# Patient Record
Sex: FEMALE | Race: WHITE | Employment: OTHER | URBAN - METROPOLITAN AREA
[De-identification: names, ages, dates, MRNs, and addresses within clinical notes are randomized per-mention and may not be internally consistent; named-entity substitution may affect disease eponyms.]

---

## 2022-03-29 LAB
AVERAGE GLUCOSE: ABNORMAL
HBA1C MFR BLD: 6.3 %

## 2022-05-25 ENCOUNTER — OFFICE VISIT (OUTPATIENT)
Dept: ENDOCRINOLOGY | Age: 87
End: 2022-05-25
Payer: MEDICARE

## 2022-05-25 VITALS
WEIGHT: 158 LBS | OXYGEN SATURATION: 98 % | SYSTOLIC BLOOD PRESSURE: 162 MMHG | HEART RATE: 72 BPM | DIASTOLIC BLOOD PRESSURE: 84 MMHG

## 2022-05-25 DIAGNOSIS — E04.2 MULTIPLE THYROID NODULES: ICD-10-CM

## 2022-05-25 DIAGNOSIS — E21.0 PRIMARY HYPERPARATHYROIDISM (HCC): ICD-10-CM

## 2022-05-25 DIAGNOSIS — I10 ESSENTIAL HYPERTENSION: ICD-10-CM

## 2022-05-25 DIAGNOSIS — E21.0 PRIMARY HYPERPARATHYROIDISM (HCC): Primary | ICD-10-CM

## 2022-05-25 PROBLEM — I49.3 PREMATURE VENTRICULAR CONTRACTIONS: Status: ACTIVE | Noted: 2022-05-25

## 2022-05-25 PROBLEM — E78.00 HYPERCHOLESTEROLEMIA: Status: ACTIVE | Noted: 2022-05-25

## 2022-05-25 PROCEDURE — G8428 CUR MEDS NOT DOCUMENT: HCPCS | Performed by: INTERNAL MEDICINE

## 2022-05-25 PROCEDURE — 4004F PT TOBACCO SCREEN RCVD TLK: CPT | Performed by: INTERNAL MEDICINE

## 2022-05-25 PROCEDURE — 1123F ACP DISCUSS/DSCN MKR DOCD: CPT | Performed by: INTERNAL MEDICINE

## 2022-05-25 PROCEDURE — G8421 BMI NOT CALCULATED: HCPCS | Performed by: INTERNAL MEDICINE

## 2022-05-25 PROCEDURE — 99204 OFFICE O/P NEW MOD 45 MIN: CPT | Performed by: INTERNAL MEDICINE

## 2022-05-25 PROCEDURE — 1090F PRES/ABSN URINE INCON ASSESS: CPT | Performed by: INTERNAL MEDICINE

## 2022-05-25 PROCEDURE — G8400 PT W/DXA NO RESULTS DOC: HCPCS | Performed by: INTERNAL MEDICINE

## 2022-05-25 RX ORDER — ATORVASTATIN CALCIUM 40 MG/1
40 TABLET, FILM COATED ORAL DAILY
COMMUNITY
Start: 2022-03-30

## 2022-05-25 RX ORDER — ASPIRIN 81 MG/1
81 TABLET ORAL DAILY
COMMUNITY

## 2022-05-25 RX ORDER — AMLODIPINE BESYLATE 10 MG/1
10 TABLET ORAL DAILY
COMMUNITY
Start: 2022-03-30

## 2022-05-25 RX ORDER — SENNOSIDES 8.6 MG
CAPSULE ORAL EVERY 8 HOURS
COMMUNITY

## 2022-05-25 RX ORDER — FLUTICASONE PROPIONATE 50 MCG
SPRAY, SUSPENSION (ML) NASAL
COMMUNITY
Start: 2022-03-30

## 2022-05-25 RX ORDER — LORATADINE 10 MG/1
TABLET ORAL
COMMUNITY

## 2022-05-25 ASSESSMENT — ENCOUNTER SYMPTOMS
DIARRHEA: 0
CONSTIPATION: 0
TROUBLE SWALLOWING: 0
VOICE CHANGE: 1

## 2022-05-25 NOTE — PROGRESS NOTES
Maira Strauss MD, [address]            Reason for visit: Clayton Marvin is referred by JENNIFER Harris NP ([address] [place] [address]") for the evaluation and management of hyperparathyroidism and multinodular goiter. ASSESSMENT AND PLAN:    1. Primary hyperparathyroidism ([address]  She has hypercalcemia associated with nonsuppressed parathyroid hormone, diagnostic of primary hyperparathyroidism. She indicates that this has been present since at least 1990. She has never had a bone density test.  I will arrange one. I will also check labs as below today. We discussed treatment options (surgery versus medical therapy with Cinacalcet). Based on her advanced age, I recommend medical therapy if treatment is indicated. Return in 4 months with repeat labs. - Comprehensive Metabolic Panel; Future  - PTH, Intact; Future  - Vitamin D 25 Hydroxy; Future  - Comprehensive Metabolic Panel; Future  - Vitamin D 25 Hydroxy; Future  - DEXA BONE DENSITY PERIPHERAL; Future    2. Multiple thyroid nodules  Unfortunately, the quality of the ultrasound interpretation is poor (no details are provided about the thyroid nodules). The extent of evaluation of nodules depends upon their size and TI-RADS classifications. For now, I will just monitor with ultrasound.  - TSH with Reflex; Future    3. Essential hypertension  I recommend a thiazide diuretic therapy be avoided. Follow-up and Dispositions    · Return in about 4 months (around 9/25/2022). History of Present Illness:    HYPERCALCEMIA  Clayton Marvin is here for new evaluation of hypercalcemia.     Presentation/diagnosis: present since at least 1990 per patient    Related comorbidies/clinical features:   -Metabolic bone disease: no prior assessment  -Fragility fractures: none  -Nephrolithiasis: none  -Renal dysfunction: none  -Thiazide diuretic use: none  -Calcium/vitamin D supplementation: none    Bone densitometry/DXA: no prior    Symptoms:  See review of systems below    Labs:  2/10/2022: Calcium 11.5.  2/17/2022: Calcium 11.3.  3/28/2022: Calcium 10.2, ionized calcium 1.54 (reference 1.11-1.30), albumin 3.8, intact parathyroid hormone 131.2, 25-hydroxy vitamin D 22.4. Imaging:  3/28/2022: Ultrasound ([facility])- Right lobe 2.13 x 4.26 x 1.8 cm, isthmus 0.2 cm, left lobe 2.37 x 4.5 x 2.7 cm. Heterogeneous echotexture. 1.2 x 0.9 x 1.1 cm nodule in the right lobe. 1.6 x 1.3 x 1.5 cm nodule in the right lobe. No mention of parathyroids. THYROID NODULES  Rombauersalinas Corbettiter is seen today in the [place] [id number] for new evaluation and treatment of thyroid nodules incidentally noted on ultrasound ordered as part of the evaluation of primary hyperparathyroidism. Symptoms: See review of systems below    Risk factors for malignancy: There is not a history of radiation to the head/neck other than medical/dental imaging. The patient does not have a family history of thyroid cancer. Prior imaging:  3/28/2022: Ultrasound ([facility])- Right lobe 2.13 x 4.26 x 1.8 cm, isthmus 0.2 cm, left lobe 2.37 x 4.5 x 2.7 cm. Heterogeneous echotexture. 1.2 x 0.9 x 1.1 cm nodule in the right lobe. 1.6 x 1.3 x 1.5 cm nodule in the right lobe. Prior laboratory evaluation: none available    Prior biopsies: none        Review of Systems   Constitutional: Positive for unexpected weight change (unintentionally lost 22 pounds in the last 3 months). Negative for appetite change and fatigue. HENT: Positive for voice change (hoarseness). Negative for trouble swallowing. Cardiovascular: Negative for palpitations. Gastrointestinal: Negative for constipation and diarrhea. Musculoskeletal: Negative for arthralgias and myalgias. Neurological: Negative for weakness. Psychiatric/Behavioral: Negative for sleep disturbance (sleeps 8+ hours per night). loratadine 10 mg tablet   TAKE ONE TABLET BY MOUTH ONE TIME DAILY       No current facility-administered medications for this visit. Rosie Borjas MD, FACE      Portions of this note were generated with the assistance of voice recognition software. As such, some errors in transcription may be present.

## 2022-05-26 ENCOUNTER — PATIENT MESSAGE (OUTPATIENT)
Dept: ENDOCRINOLOGY | Age: 87
End: 2022-05-26

## 2022-05-26 DIAGNOSIS — E21.0 PRIMARY HYPERPARATHYROIDISM (HCC): Primary | ICD-10-CM

## 2022-05-26 DIAGNOSIS — E55.9 VITAMIN D DEFICIENCY: ICD-10-CM

## 2022-05-26 LAB
25(OH)D3 SERPL-MCNC: 28.6 NG/ML (ref 30–100)
ALBUMIN SERPL-MCNC: 4.1 G/DL (ref 3.2–4.6)
ALBUMIN/GLOB SERPL: 1.2 {RATIO} (ref 1.2–3.5)
ALP SERPL-CCNC: 116 U/L (ref 50–136)
ALT SERPL-CCNC: 18 U/L (ref 12–65)
ANION GAP SERPL CALC-SCNC: 12 MMOL/L (ref 7–16)
AST SERPL-CCNC: 12 U/L (ref 15–37)
BILIRUB SERPL-MCNC: 0.6 MG/DL (ref 0.2–1.1)
BUN SERPL-MCNC: 12 MG/DL (ref 8–23)
CALCIUM SERPL-MCNC: 11.4 MG/DL (ref 8.3–10.4)
CALCIUM SERPL-MCNC: 11.6 MG/DL (ref 8.3–10.4)
CHLORIDE SERPL-SCNC: 110 MMOL/L (ref 98–107)
CO2 SERPL-SCNC: 19 MMOL/L (ref 21–32)
CREAT SERPL-MCNC: 0.7 MG/DL (ref 0.6–1)
GLOBULIN SER CALC-MCNC: 3.4 G/DL (ref 2.3–3.5)
GLUCOSE SERPL-MCNC: 88 MG/DL (ref 65–100)
POTASSIUM SERPL-SCNC: 4.3 MMOL/L (ref 3.5–5.1)
PROT SERPL-MCNC: 7.5 G/DL (ref 6.3–8.2)
PTH-INTACT SERPL-MCNC: 131.6 PG/ML (ref 18.5–88)
SODIUM SERPL-SCNC: 141 MMOL/L (ref 136–145)
TSH W FREE THYROID IF ABNORMAL: 0.75 UIU/ML (ref 0.36–3.74)

## 2022-05-26 RX ORDER — CINACALCET 30 MG/1
30 TABLET, FILM COATED ORAL DAILY
Qty: 30 TABLET | Refills: 11 | Status: SHIPPED | OUTPATIENT
Start: 2022-05-26

## 2022-05-26 NOTE — TELEPHONE ENCOUNTER
My Chart message to patient:    Thank you for doing labs in the office. Your calcium is still elevated, and your vitamin D level is very low. I would like you to start 2 medications:  -Cinacalcet 30 mg daily to control your calcium levels  -Cholecalciferol/vitamin D 2000 units/day to bring up your vitamin D levels    The first medication is expensive but should be covered by insurance. If you have trouble getting it covered, please call my office at [phone] for assistance. If you have questions or concerns, let me know.

## 2022-06-10 NOTE — TELEPHONE ENCOUNTER
Please let the patient know that I sent her a My Chart message 2 weeks ago, but it appears that she has not yet read it. The message was as follows: Thank you for doing labs in the office. Your calcium is still elevated, and your vitamin D level is very low. I would like you to start 2 medications:  -Cinacalcet 30 mg daily to control your calcium levels  -Cholecalciferol/vitamin D 2000 units/day to bring up your vitamin D levels     The first medication is expensive but should be covered by insurance. If you have trouble getting it covered, please call my office at [phone] for assistance. If you have questions or concerns, let me know.

## 2022-06-21 ENCOUNTER — TELEPHONE (OUTPATIENT)
Dept: ENDOCRINOLOGY | Age: 87
End: 2022-06-21

## 2022-06-21 DIAGNOSIS — E21.0 PRIMARY HYPERPARATHYROIDISM (HCC): Primary | ICD-10-CM

## 2022-06-21 DIAGNOSIS — E55.9 VITAMIN D DEFICIENCY: ICD-10-CM

## 2022-06-21 NOTE — TELEPHONE ENCOUNTER
Dexa scan reordered, per [facility] [[email or web address] radiology they could not see the order that is currently in the new system.

## 2022-06-30 ENCOUNTER — HOSPITAL ENCOUNTER (OUTPATIENT)
Dept: MAMMOGRAPHY | Age: 87
Discharge: HOME | End: 2022-07-03
Payer: MEDICARE

## 2022-06-30 DIAGNOSIS — E21.0 PRIMARY HYPERPARATHYROIDISM (HCC): ICD-10-CM

## 2022-06-30 DIAGNOSIS — E55.9 VITAMIN D DEFICIENCY: ICD-10-CM

## 2022-06-30 PROCEDURE — 77080 DXA BONE DENSITY AXIAL: CPT

## 2022-10-07 DIAGNOSIS — E21.0 PRIMARY HYPERPARATHYROIDISM (HCC): ICD-10-CM

## 2022-10-07 DIAGNOSIS — E55.9 VITAMIN D DEFICIENCY: ICD-10-CM

## 2022-10-07 DIAGNOSIS — E21.0 PRIMARY HYPERPARATHYROIDISM (HCC): Primary | ICD-10-CM

## 2022-10-07 LAB
25(OH)D3 SERPL-MCNC: 34.3 NG/ML (ref 30–100)
ALBUMIN SERPL-MCNC: 3.7 G/DL (ref 3.2–4.6)
ALBUMIN/GLOB SERPL: 1.2 {RATIO} (ref 1.2–3.5)
ALP SERPL-CCNC: 101 U/L (ref 50–136)
ALT SERPL-CCNC: 22 U/L (ref 12–65)
ANION GAP SERPL CALC-SCNC: 5 MMOL/L (ref 4–13)
AST SERPL-CCNC: 7 U/L (ref 15–37)
BILIRUB SERPL-MCNC: 0.8 MG/DL (ref 0.2–1.1)
BUN SERPL-MCNC: 11 MG/DL (ref 8–23)
CALCIUM SERPL-MCNC: 10.8 MG/DL (ref 8.3–10.4)
CHLORIDE SERPL-SCNC: 108 MMOL/L (ref 101–110)
CO2 SERPL-SCNC: 28 MMOL/L (ref 21–32)
CREAT SERPL-MCNC: 0.6 MG/DL (ref 0.6–1)
GLOBULIN SER CALC-MCNC: 3 G/DL (ref 2.3–3.5)
GLUCOSE SERPL-MCNC: 109 MG/DL (ref 65–100)
POTASSIUM SERPL-SCNC: 5 MMOL/L (ref 3.5–5.1)
PROT SERPL-MCNC: 6.7 G/DL (ref 6.3–8.2)
SODIUM SERPL-SCNC: 141 MMOL/L (ref 136–145)

## 2023-01-09 ENCOUNTER — HOSPITAL ENCOUNTER (EMERGENCY)
Age: 88
Discharge: HOME | End: 2023-01-09
Attending: EMERGENCY MEDICINE
Payer: MEDICARE

## 2023-01-09 ENCOUNTER — HOSPITAL ENCOUNTER (EMERGENCY)
Dept: GENERAL RADIOLOGY | Age: 88
Discharge: HOME | End: 2023-01-12
Payer: MEDICARE

## 2023-01-09 VITALS
HEIGHT: 62 IN | DIASTOLIC BLOOD PRESSURE: 85 MMHG | RESPIRATION RATE: 18 BRPM | BODY MASS INDEX: 26.5 KG/M2 | HEART RATE: 90 BPM | OXYGEN SATURATION: 98 % | TEMPERATURE: 98.3 F | WEIGHT: 144 LBS | SYSTOLIC BLOOD PRESSURE: 158 MMHG

## 2023-01-09 DIAGNOSIS — S83.411A SPRAIN OF MEDIAL COLLATERAL LIGAMENT OF RIGHT KNEE, INITIAL ENCOUNTER: Primary | ICD-10-CM

## 2023-01-09 DIAGNOSIS — M11.20 CALCIUM PYROPHOSPHATE DEPOSITION DISEASE: ICD-10-CM

## 2023-01-09 PROCEDURE — 6360000002 HC RX W HCPCS: Performed by: EMERGENCY MEDICINE

## 2023-01-09 PROCEDURE — 73562 X-RAY EXAM OF KNEE 3: CPT

## 2023-01-09 PROCEDURE — 96372 THER/PROPH/DIAG INJ SC/IM: CPT

## 2023-01-09 PROCEDURE — 99284 EMERGENCY DEPT VISIT MOD MDM: CPT

## 2023-01-09 RX ORDER — KETOROLAC TROMETHAMINE 30 MG/ML
30 INJECTION, SOLUTION INTRAMUSCULAR; INTRAVENOUS
Status: COMPLETED | OUTPATIENT
Start: 2023-01-09 | End: 2023-01-09

## 2023-01-09 RX ADMIN — KETOROLAC TROMETHAMINE 30 MG: 30 INJECTION, SOLUTION INTRAMUSCULAR at 10:32

## 2023-01-09 NOTE — ED NOTES
I have reviewed discharge instructions with the patient. The patient verbalized understanding. Patient left ED via Discharge Method: ambulatory to Home with self. Opportunity for questions and clarification provided. Patient given 1 scripts. To continue your aftercare when you leave the hospital, you may receive an automated call from our care team to check in on how you are doing. This is a free service and part of our promise to provide the best care and service to meet your aftercare needs.  If you have questions, or wish to unsubscribe from this service please call [phone]. Thank you for Choosing our [facility] Emergency Department.         Azar Hernandez RN  01/09/23 8759

## 2023-01-09 NOTE — ED TRIAGE NOTES
Pt states that she's had right sided knee pain for the past 3-4 days. Pt was seen by her PCP who thinks it is arthritis. Pt states that the pain has continued. Pt denies new injury or trauma.

## 2023-01-09 NOTE — ED PROVIDER NOTES
Emergency Department Provider Note                   PCP:                JENNIFER Schwab NP               Age: 82 y.o. Sex: female       ICD-10-CM    1. Sprain of medial collateral ligament of right knee, initial encounter  S83.411A       2. 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 Decision-making details documented in ED Course. Risk  Prescription drug management. Complexity of Problem: 1 acute, uncomplicated illness or injury. (3)    I have conducted an independent ordering and review of X-rays. I have reviewed records from an external source: provider visit notes from PCP. Considerations: Shared decision making was utilized in the care of this patient. ED Course as of 01/09/23 0959   Mon Jan 09, 2023   0924 XR KNEE RIGHT (3 VIEWS)  IMPRESSION:  1. Chondrocalcinosis. Findings raise the possibility of CPPD. 2. Osteopenia. 3. Degenerative change of the patellofemoral compartment with trace joint  effusion.  [KH]      ED Course User Index  [KH] Abigail Galloway DO        Orders Placed This Encounter   Procedures    XR KNEE RIGHT (3 VIEWS)        Medications   ketorolac (TORADOL) injection 30 mg (has no administration in time range)       New Prescriptions    DICLOFENAC SODIUM (VOLTAREN) 1 % GEL    Apply 2 g topically 2 times daily as needed for Pain Apply to the affected area        Dyke Pain is a 82 y.o. female who presents to the Emergency Department with chief complaint of    Chief Complaint   Patient presents with    Leg Pain      Patient is a very pleasant 26-year-old female presenting to the emergency department a complaining of knee pain. The patient states she was walking out to her mailbox and coming back when she felt a pop and had pain along the medial right knee. The patient states that over the last 2 days she has been taking Tylenol and aspirin and had minimal relief of her symptoms. The patient did not fall or have any trauma. She has no other acute complaints. She denies any history of gout. Review of Systems   Constitutional: Negative. Musculoskeletal:  Positive for arthralgias, gait problem and joint swelling. Skin: Negative. Hematological: Negative.       Past Medical History:   Diagnosis Date    Essential hypertension     Hypercholesterolemia     Multiple thyroid nodules     Premature ventricular contractions     Primary hyperpar[address] ([facility] [address]         Past Surgical History:   Procedure Laterality Date     SECTION      CHOLECYSTECTOMY      COLECTOMY      HERNIA REPAIR      HYSTERECTOMY, TOTAL ABDOMINAL (CERVIX REMOVED)      OVARY REMOVAL      unilateral    SALPINGECTOMY          Family History   Problem Relation Age of Onset    Diabetes Mother     Diabetes Father     Stroke Sister     Stroke Sister     Stroke Sister     Diabetes Maternal Grandmother     Diabetes Maternal Grandfather     Breast Cancer Daughter     Diabetes Son     Breast Cancer Niece     Thyroid Cancer Neg Hx     Thyroid Disease Neg Hx         Social History     Socioeconomic History    Marital status:      Spouse name: None    Number of children: None    Years of education: None    Highest education level: None        Allergies: Codeine, Gabapentin, Lisinopril, and Tetracycline    Previous Medications    ACETAMINOPHEN (TYLENOL 8 HOUR) 650 MG EXTENDED RELEASE TABLET    every 8 (eight) hours    AMLODIPINE (NORVASC) 10 MG TABLET    Take 10 mg by mouth daily    ASPIRIN 81 MG EC TABLET    Take 81 mg by mouth daily    ATORVASTATIN (LIPITOR) 40 MG TABLET    Take 40 mg by mouth daily    CHOLECALCIFEROL 50 MCG (2000 UT) TABS    Take 1 tablet by mouth daily    CINACALCET (SENSIPAR) 30 MG TABLET    Take 1 tablet by mouth daily    FLUTICASONE (FLONASE) 50 MCG/ACT NASAL SPRAY    USE TWO SPRAYS IN THE AFFECTED NOSTRIL ONE TIME DAILY    LORATADINE (CLARITIN) 10 MG TABLET    loratadine 10 mg tablet   TAKE ONE TABLET BY MOUTH ONE TIME DAILY        Vitals signs and nursing note reviewed. Patient Vitals for the past 4 hrs:   Temp Pulse Resp BP SpO2   01/09/23 0820 -- -- -- (!) 158/85 --   01/09/23 0819 98.3 °F (36.8 °C) 90 18 -- 98 %          Physical Exam     GENERAL:The patient has Body mass index is 26.34 kg/m². Well-hydrated. No acute distress. VITAL SIGNS: Heart rate, blood pressure, respiratory rate reviewed as recorded in  nurse's notes  EYES: Pupils reactive. Extraocular motion intact. No conjunctival redness or drainage. LUNGS: No accessory muscle use  CARDIOVASCULAR: Regular rate and rhythm  EXTREMITIES: Tenderness palpation over the right MCL. There is some mild swelling in this region but no ecchymosis. The patient has a negative valgus varus stress test.  Negative Lachman's test.  Good endpoints in all ligamentous testing. No tenderness palpation over the patella or quadriceps tendon. Dorsalis pedis and posterior tibial pulses are present and 2+  NEUROLOGIC: Cranial nerve exam reveals face is symmetrical, tongue is midline  speech is clear. No focal deficits noted  SKIN: No rash or petechiae. Good skin turgor palpated. PSYCHIATRIC: Alert and oriented. Appropriate behavior and judgment. Procedures    Results for orders placed or performed during the hospital encounter of 01/09/23   XR KNEE RIGHT (3 VIEWS)    Narrative    History: Right-sided knee pain, 3-4 days duration. No injury. EXAM: 3 views right knee    FINDINGS: There is joint space narrowing involving the patellofemoral  compartment with early osteophyte formation.  In addition, there is evidence of  chondrocalcinosis. There is osteopenia. There is a trace joint effusion. Impression    1. Chondrocalcinosis. Findings raise the possibility of CPPD. 2. Osteopenia. 3. Degenerative change of the patellofemoral compartment with trace joint  effusion. XR KNEE RIGHT (3 VIEWS)   Final Result   1. Chondrocalcinosis. Findings raise the possibility of CPPD. 2. Osteopenia. 3. Degenerative change of the patellofemoral compartment with trace joint   effusion. Voice dictation software was used during the making of this note. This software is not perfect and grammatical and other typographical errors may be present. This note has not been completely proofread for errors.        Harper Mclean DO  01/09/23 0489

## 2023-01-09 NOTE — DISCHARGE INSTRUCTIONS
Get the MCL knee brace and start wearing it as directed. Use ice for 15 to 20 minutes over the affected area 3-4 times daily to decrease swelling. Apply the diclofenac gel to the area twice daily. If your symptoms persist you may need to follow-up with orthopedic surgery you will have to call and schedule an appointment.

## 2023-01-13 ENCOUNTER — HOME HEALTH ADMISSION (OUTPATIENT)
Dept: HOME HEALTH SERVICES | Facility: HOME HEALTH | Age: 88
End: 2023-01-13
Payer: MEDICARE

## 2023-01-13 ENCOUNTER — OFFICE VISIT (OUTPATIENT)
Dept: ORTHOPEDIC SURGERY | Age: 88
End: 2023-01-13

## 2023-01-13 DIAGNOSIS — M25.561 ACUTE PAIN OF RIGHT KNEE: Primary | ICD-10-CM

## 2023-01-13 DIAGNOSIS — M17.11 PRIMARY OSTEOARTHRITIS OF RIGHT KNEE: ICD-10-CM

## 2023-01-13 RX ORDER — METHYLPREDNISOLONE ACETATE 40 MG/ML
40 INJECTION, SUSPENSION INTRA-ARTICULAR; INTRALESIONAL; INTRAMUSCULAR; SOFT TISSUE ONCE
Status: COMPLETED | OUTPATIENT
Start: 2023-01-13 | End: 2023-01-13

## 2023-01-13 RX ADMIN — METHYLPREDNISOLONE ACETATE 40 MG: 40 INJECTION, SUSPENSION INTRA-ARTICULAR; INTRALESIONAL; INTRAMUSCULAR; SOFT TISSUE at 10:22

## 2023-01-13 NOTE — PROGRESS NOTES
Name: Nile Marvin  YOB: 1935  Gender: female  MRN: [id number]      Chief complaint:  RIGHT knee pain    History of Present Illness:     Hero Chew is a 82 y.o. female  The pain has been present for 1 week. She states she was was walking and felt a sharp pain and pop over the medial aspect of the left knee. She denies any fall or injury that may have caused this. They state the pain is located medial.  The patient describes the quality of the pain as a deep ache. The symptoms are exacerbated by weight bearing, walking and standing for long periods of time. The pain is also exacerbated by ascending and descending stairs, getting up and down from a chair. They deny any previous surgery on the knee. Previous treatment includes anti-inflammatories, use of cane/walker and activity modification. Because of ongoing pain in the right knee she has been using a wheelchair to scoot around in her house. These treatment options have not helped. She was seen in the ER on 1/9/2023 and had x-rays performed of the right knee showing some mild DJD of the right knee but no acute fracture. She was given a brace to wear on the right knee which she states helps. Past Medical History: Allergies:   Allergies   Allergen Reactions    Codeine Nausea Only    Gabapentin Other (See Comments)     Made her hyper and could not sleep    Lisinopril     Tetracycline Hives       Medications:  Current Outpatient Medications   Medication Sig    diclofenac sodium (VOLTAREN) 1 % GEL Apply 2 g topically 2 times daily as needed for Pain Apply to the affected area    cinacalcet (SENSIPAR) 30 MG tablet Take 1 tablet by mouth daily    Cholecalciferol 50 MCG (2000 UT) TABS Take 1 tablet by mouth daily    amLODIPine (NORVASC) 10 MG tablet Take 10 mg by mouth daily    atorvastatin (LIPITOR) 40 MG tablet Take 40 mg by mouth daily    aspirin 81 MG EC tablet Take 81 mg by mouth daily    acetaminophen (TYLENOL 8 HOUR) 650 MG extended release tablet every 8 (eight) hours    fluticasone (FLONASE) 50 MCG/ACT nasal spray USE TWO SPRAYS IN THE AFFECTED NOSTRIL ONE TIME DAILY    loratadine (CLARITIN) 10 MG tablet loratadine 10 mg tablet   TAKE ONE TABLET BY MOUTH ONE TIME DAILY     No current facility-administered medications for this visit. Past Medical history:  Past Medical History:   Diagnosis Date    Essential hypertension     Hypercholesterolemia     Multiple thyroid nodules     Premature ventricular contractions     Primary hyperparathyroidism ([address]         has a past surgical history that includes Cholecystectomy;  section; Hysterectomy, total abdominal; hernia repair; colectomy (); salpingectomy; and Ovary removal.     Family History:  [unfilled]     Social History:  [unfilled]    Review of Systems:       Physical Exam:    General:   Alert and oriented    Gait:          Normal gait    Back:        No tenderness over lower back. RIGHT Hip:    Skin is intact. No pain with palpation over the greater trochanter. No groin pain and restricted ROM of the hip     RIGHT Knee: Skin: normal                    Effusion: Mild                    Tenderness to palpation: Diffuse medially                    Patella grind test: no                    Stability:  Valgus: yes Varus: yes AP: yes                    Alina's test: negative                    Quad Strength: good                    ROM   Extension: 5 Flexion: 110 (motion limited today due to pain)                    Popliteal cyst : no                    Calf pain : no                    Edema left leg: none noted    Neurovascular:  Patient has good pulses distally. They have intact motor and sensory function.      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 We will attempt a cortisone injection in the right knee today to see if it will help provide some relief. We will also have home therapy work with her to work on her mobility and gait. Also we will order MRI of the right knee to rule out underlying stress fracture, occult meniscal tear, etc.  She will continue with use of the brace as well as applying the Voltaren gel to the right knee. We will see her back in roughly 2 weeks to discus MRI results and appropriate treatment options at that time. She was in agreement this plan. Procedure note: The RIGHT knee was injected under sterile technique with 40 mg Depo-Medrol and 1 cc 0.5% Marcaine. Patient tolerated this without difficulty.         CHRISTY Ron

## 2023-01-16 ENCOUNTER — HOME CARE VISIT (OUTPATIENT)
Dept: SCHEDULING | Facility: HOME HEALTH | Age: 88
End: 2023-01-16

## 2023-01-16 VITALS
HEART RATE: 66 BPM | DIASTOLIC BLOOD PRESSURE: 86 MMHG | OXYGEN SATURATION: 96 % | SYSTOLIC BLOOD PRESSURE: 136 MMHG | TEMPERATURE: 97.3 F | RESPIRATION RATE: 17 BRPM

## 2023-01-16 PROCEDURE — G0151 HHCP-SERV OF PT,EA 15 MIN: HCPCS

## 2023-01-16 PROCEDURE — 0221000100 HH NO PAY CLAIM PROCEDURE

## 2023-01-16 ASSESSMENT — ENCOUNTER SYMPTOMS
PAIN LOCATION - PAIN QUALITY: ACHING
DYSPNEA ACTIVITY LEVEL: AFTER AMBULATING MORE THAN 20 FT

## 2023-01-18 ENCOUNTER — HOME CARE VISIT (OUTPATIENT)
Dept: SCHEDULING | Facility: HOME HEALTH | Age: 88
End: 2023-01-18

## 2023-01-18 VITALS
SYSTOLIC BLOOD PRESSURE: 138 MMHG | RESPIRATION RATE: 16 BRPM | OXYGEN SATURATION: 97 % | DIASTOLIC BLOOD PRESSURE: 64 MMHG | TEMPERATURE: 98.4 F | HEART RATE: 83 BPM

## 2023-01-18 PROCEDURE — G0157 HHC PT ASSISTANT EA 15: HCPCS

## 2023-01-18 ASSESSMENT — ENCOUNTER SYMPTOMS: PAIN LOCATION - PAIN QUALITY: ACHE

## 2023-01-24 ENCOUNTER — HOME CARE VISIT (OUTPATIENT)
Dept: SCHEDULING | Facility: HOME HEALTH | Age: 88
End: 2023-01-24
Payer: MEDICARE

## 2023-01-24 ENCOUNTER — HOME CARE VISIT (OUTPATIENT)
Dept: HOME HEALTH SERVICES | Facility: HOME HEALTH | Age: 88
End: 2023-01-24
Payer: MEDICARE

## 2023-01-24 VITALS
RESPIRATION RATE: 16 BRPM | HEART RATE: 81 BPM | OXYGEN SATURATION: 96 % | SYSTOLIC BLOOD PRESSURE: 136 MMHG | DIASTOLIC BLOOD PRESSURE: 60 MMHG | TEMPERATURE: 99.1 F

## 2023-01-24 PROCEDURE — G0157 HHC PT ASSISTANT EA 15: HCPCS

## 2023-01-24 ASSESSMENT — ENCOUNTER SYMPTOMS: PAIN LOCATION - PAIN QUALITY: ACHE

## 2023-01-24 NOTE — CASE COMMUNICATION
Pt reported that she received a call at 9:32am yesterday morning from [address] at Dr. Celine Ralph office. She stated that she was told not to put any weight on her RLE and that, even though her appointment is scheduled for 2/8/2023, they were going to try to get her in sooner. Pt was told to use the w/c or NWB with walker. PTA requesting reassessment to confirm and update orders from physician. Additionally, pt reporting 8/10 pain at  rest. PTA simply reporting due to being outside of parameters. Advise if needed.

## 2023-01-24 NOTE — Clinical Note
----- Message -----  From: Benjie Ulrich MA  Sent: 1/24/2023  10:43 AM EST  To: Troy Desouza PTA      Good morning. So I did speak with her yesterday but I did not tell her not to put any weight on her leg I told her to make sure she wears the brace she received from the ER and to make sure she uses a cane or walker for her to get around if she is not using the wheelchair. I also told her that she needed to make sure to do these things until her appointment on 2/8. We do not need to see her before her scheduled appoinment. And she is weight bearing as tolerated. Thanks  ----- Message -----  From: Troy Desouza PTA  Sent: 1/24/2023   9:53 AM EST  To: Paz Vaz MD, *    Pt reported that she received a call at 9:32am yesterday morning from [address] Roc Coronel office. She stated that she was told not to put any weight on her RLE and that, even though her appointment is scheduled for 2/8/2023, they were going to try to get her in sooner. Pt was told to use the w/c or NWB with walker. PTA requesting reassessment to confirm and update orders from physician. Additionally, pt reporting 8/10 pain at rest. PTA simply reporting due to being outside of parameters. Advise if needed.

## 2023-01-24 NOTE — CASE COMMUNICATION
PTA received a case communication from this morning from Ban Piña MA clarifying the conversation that she had with the pt yesterday morning. Ban stated that the pt is WBAT and that she should continue to wear her brace until her appointment on 2/8/2023. PTA contacted the pt this afternoon at 3:47pm and relayed this information to the pt. In this conversation, the pt was agreeable to continue with current POC.    PTA no lo nger requesting reassessment due no true change in status. Will continue per POC. PTA informed , Marilynn Kevin, PT.

## 2023-01-26 ENCOUNTER — HOME CARE VISIT (OUTPATIENT)
Dept: SCHEDULING | Facility: HOME HEALTH | Age: 88
End: 2023-01-26
Payer: MEDICARE

## 2023-01-26 VITALS
SYSTOLIC BLOOD PRESSURE: 118 MMHG | HEART RATE: 84 BPM | DIASTOLIC BLOOD PRESSURE: 64 MMHG | OXYGEN SATURATION: 95 % | TEMPERATURE: 98.3 F | RESPIRATION RATE: 18 BRPM

## 2023-01-26 PROCEDURE — G0157 HHC PT ASSISTANT EA 15: HCPCS

## 2023-01-26 ASSESSMENT — ENCOUNTER SYMPTOMS: PAIN LOCATION - PAIN QUALITY: ACHE

## 2023-01-31 ENCOUNTER — HOME CARE VISIT (OUTPATIENT)
Dept: SCHEDULING | Facility: HOME HEALTH | Age: 88
End: 2023-01-31
Payer: MEDICARE

## 2023-01-31 VITALS
HEART RATE: 88 BPM | TEMPERATURE: 98.1 F | OXYGEN SATURATION: 96 % | DIASTOLIC BLOOD PRESSURE: 68 MMHG | RESPIRATION RATE: 16 BRPM | SYSTOLIC BLOOD PRESSURE: 124 MMHG

## 2023-01-31 PROCEDURE — G0157 HHC PT ASSISTANT EA 15: HCPCS

## 2023-02-02 ENCOUNTER — HOME CARE VISIT (OUTPATIENT)
Dept: SCHEDULING | Facility: HOME HEALTH | Age: 88
End: 2023-02-02
Payer: MEDICARE

## 2023-02-02 VITALS
SYSTOLIC BLOOD PRESSURE: 128 MMHG | RESPIRATION RATE: 16 BRPM | HEART RATE: 76 BPM | DIASTOLIC BLOOD PRESSURE: 64 MMHG | OXYGEN SATURATION: 96 % | TEMPERATURE: 98.1 F

## 2023-02-02 PROCEDURE — G0157 HHC PT ASSISTANT EA 15: HCPCS

## 2023-02-08 ENCOUNTER — OFFICE VISIT (OUTPATIENT)
Dept: ORTHOPEDIC SURGERY | Age: 88
End: 2023-02-08

## 2023-02-08 DIAGNOSIS — M17.11 PRIMARY OSTEOARTHRITIS OF RIGHT KNEE: Primary | ICD-10-CM

## 2023-02-08 NOTE — PROGRESS NOTES
Name: Harrison Marvin  YOB: 1935  Gender: female  MRN: [id number]      Chief complaint:  RIGHT KNEE PAIN    History of Present Illness:     Geovanna Faustin is a 82 y.o. female  She returns today for recheck on her right knee and MRI follow-up. Overall the knee is doing a lot better on her account. We did perform a cortisone injection the right knee during her last visit which she states helped tremendously. Overall she states her knee is more than 80% better compared to her original visit with us. She is currently ambulating without any assistive device. She has stopped taking any Tylenol for pain because she is really not having any. She is also discharged from physical therapy because she was making good progress. Voices no significant complaints today. Still is using the brace because it does provide some comfort and support for her. Past Medical History: Allergies: Allergies   Allergen Reactions    Codeine Nausea Only    Gabapentin Other (See Comments)     Made her hyper and could not sleep    Lisinopril     Tetracycline Hives       Medications:  Current Outpatient Medications   Medication Sig    magnesium 200 MG TABS tablet Take 200 mg by mouth daily. Docusate Sodium 100 MG TABS Take 1 tablet by mouth daily. diclofenac sodium (VOLTAREN) 1 % GEL Apply 2 g topically 2 times daily as needed for Pain Apply to the affected area (Patient taking differently: Apply 2 g topically 2 times daily as needed for Pain.  Apply to R knee)    cinacalcet (SENSIPAR) 30 MG tablet Take 1 tablet by mouth daily    Cholecalciferol 50 MCG (2000 UT) TABS Take 1 tablet by mouth daily    amLODIPine (NORVASC) 10 MG tablet Take 10 mg by mouth daily    atorvastatin (LIPITOR) 40 MG tablet Take 40 mg by mouth daily    aspirin 81 MG EC tablet Take 81 mg by mouth daily    acetaminophen (TYLENOL 8 HOUR) 650 MG extended release tablet Take 650 mg by mouth every 8 hours as needed for Pain fluticasone (FLONASE) 50 MCG/ACT nasal spray USE TWO SPRAYS IN THE AFFECTED NOSTRIL ONE TIME DAILY    loratadine (CLARITIN) 10 MG tablet loratadine 10 mg tablet   TAKE ONE TABLET BY MOUTH ONE TIME DAILY     No current facility-administered medications for this visit. Past Medical history:  Past Medical History:   Diagnosis Date    Essential hypertension     Hypercholesterolemia     Multiple thyroid nodules     Premature ventricular contractions     Primary hyperparathyroidism ([address]         has a past surgical history that includes Cholecystectomy;  section; Hysterectomy, total abdominal; hernia repair; colectomy (); salpingectomy; and Ovary removal.     Family History:  [unfilled]     Social History:  [unfilled]    Review of Systems:       Physical Exam:    General:   Alert and oriented    Gait:          Normal gait    Back:        No tenderness over lower back. RIGHT Hip:    Skin is intact. No pain with palpation over the greater trochanter. No groin pain and restricted ROM of the hip     RIGHT Knee: Skin: normal                    Effusion: no                    Tenderness to palpation: none                    Patella grind test: no                    Stability:  Valgus: yes Varus: yes AP: yes                    Alina's test: negative                    Quad Strength: good                    ROM   Extension: 0  Flexion:120                    Popliteal cyst : no                    Calf pain : no                    Edema left leg: none noted    Neurovascular:  Patient has good pulses distally. They have intact motor and sensory function. MRI right knee:  1. Acute subchondral insufficiency fracture to the medial tibial plateau. 2. High-grade radial tear in the medial meniscal posterior horn resulting in   partial meniscal extrusion.    3. Periligamentous edema along the MCL which could represent grade 1 injury or   reactive edema to adjacent medial compartment pathology. 4. Mild free edge fraying of the lateral meniscal posterior horn. 5. Semimembranosus insertional tendinosis   6. Mild tricompartmental osteoarthrosis, most advanced in the lateral   compartment. 7. Small Baker's cyst.       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 Fortunately she is much improved in terms of her symptoms. She was counseled about the fact that she does have DJD of the knee and that if symptoms flareup again this could be addressed with conservative measures. Those would include cortisone injections, viscosupplementation, continued use of a brace or sleeve, and medication such as Tylenol or NSAIDs if tolerable. If these ultimately fail then there is a possibility of surgical intervention with that being a right TKA. This seems very unlikely at this point as she is responding quite well to her options that we provide her with her previously. She will gradually ease back into activities exercise as she tolerates. She likes the support that the brace is giving her at this time and she will gradually wean out of it at her own discretion. She let us know if things flareup again and otherwise see her back on an as-needed basis.       CHRISTY Restrepo

## 2023-02-09 ENCOUNTER — HOME CARE VISIT (OUTPATIENT)
Dept: SCHEDULING | Facility: HOME HEALTH | Age: 88
End: 2023-02-09
Payer: MEDICARE

## 2023-02-09 VITALS
SYSTOLIC BLOOD PRESSURE: 124 MMHG | OXYGEN SATURATION: 96 % | RESPIRATION RATE: 16 BRPM | HEART RATE: 82 BPM | TEMPERATURE: 98.1 F | DIASTOLIC BLOOD PRESSURE: 66 MMHG

## 2023-02-09 PROCEDURE — G0151 HHCP-SERV OF PT,EA 15 MIN: HCPCS

## 2023-02-09 ASSESSMENT — ENCOUNTER SYMPTOMS: PAIN LOCATION - PAIN QUALITY: THROBBING

## 2024-03-27 ENCOUNTER — OFFICE VISIT (OUTPATIENT)
Dept: ENDOCRINOLOGY | Age: 89
End: 2024-03-27
Payer: MEDICARE

## 2024-03-27 VITALS
DIASTOLIC BLOOD PRESSURE: 78 MMHG | BODY MASS INDEX: 26.89 KG/M2 | SYSTOLIC BLOOD PRESSURE: 121 MMHG | OXYGEN SATURATION: 98 % | WEIGHT: 147 LBS | HEART RATE: 57 BPM

## 2024-03-27 DIAGNOSIS — E21.0 PRIMARY HYPERPARATHYROIDISM (HCC): ICD-10-CM

## 2024-03-27 DIAGNOSIS — E55.9 VITAMIN D DEFICIENCY: ICD-10-CM

## 2024-03-27 DIAGNOSIS — E21.0 PRIMARY HYPERPARATHYROIDISM (HCC): Primary | ICD-10-CM

## 2024-03-27 DIAGNOSIS — M85.851 OSTEOPENIA OF RIGHT FEMORAL NECK: ICD-10-CM

## 2024-03-27 DIAGNOSIS — E04.2 MULTIPLE THYROID NODULES: ICD-10-CM

## 2024-03-27 LAB
25(OH)D3 SERPL-MCNC: 24.8 NG/ML (ref 30–100)
ALBUMIN SERPL-MCNC: 3.6 G/DL (ref 3.2–4.6)
ALBUMIN/GLOB SERPL: 1.1 (ref 0.4–1.6)
ALP SERPL-CCNC: 111 U/L (ref 50–136)
ALT SERPL-CCNC: 13 U/L (ref 12–65)
ANION GAP SERPL CALC-SCNC: 3 MMOL/L (ref 2–11)
AST SERPL-CCNC: 9 U/L (ref 15–37)
BILIRUB SERPL-MCNC: 0.6 MG/DL (ref 0.2–1.1)
BUN SERPL-MCNC: 12 MG/DL (ref 8–23)
CALCIUM SERPL-MCNC: 11.2 MG/DL (ref 8.3–10.4)
CHLORIDE SERPL-SCNC: 109 MMOL/L (ref 103–113)
CO2 SERPL-SCNC: 29 MMOL/L (ref 21–32)
CREAT SERPL-MCNC: 0.9 MG/DL (ref 0.6–1)
GLOBULIN SER CALC-MCNC: 3.2 G/DL (ref 2.8–4.5)
GLUCOSE SERPL-MCNC: 111 MG/DL (ref 65–100)
PHOSPHATE SERPL-MCNC: 2.1 MG/DL (ref 2.3–3.7)
POTASSIUM SERPL-SCNC: 4.5 MMOL/L (ref 3.5–5.1)
PROT SERPL-MCNC: 6.8 G/DL (ref 6.3–8.2)
SODIUM SERPL-SCNC: 141 MMOL/L (ref 136–146)
TSH W FREE THYROID IF ABNORMAL: 0.81 UIU/ML (ref 0.36–3.74)

## 2024-03-27 PROCEDURE — 1090F PRES/ABSN URINE INCON ASSESS: CPT | Performed by: INTERNAL MEDICINE

## 2024-03-27 PROCEDURE — 76536 US EXAM OF HEAD AND NECK: CPT | Performed by: INTERNAL MEDICINE

## 2024-03-27 PROCEDURE — 1123F ACP DISCUSS/DSCN MKR DOCD: CPT | Performed by: INTERNAL MEDICINE

## 2024-03-27 PROCEDURE — G8419 CALC BMI OUT NRM PARAM NOF/U: HCPCS | Performed by: INTERNAL MEDICINE

## 2024-03-27 PROCEDURE — 4004F PT TOBACCO SCREEN RCVD TLK: CPT | Performed by: INTERNAL MEDICINE

## 2024-03-27 PROCEDURE — 99214 OFFICE O/P EST MOD 30 MIN: CPT | Performed by: INTERNAL MEDICINE

## 2024-03-27 PROCEDURE — G8484 FLU IMMUNIZE NO ADMIN: HCPCS | Performed by: INTERNAL MEDICINE

## 2024-03-27 PROCEDURE — G8427 DOCREV CUR MEDS BY ELIG CLIN: HCPCS | Performed by: INTERNAL MEDICINE

## 2024-03-27 RX ORDER — CINACALCET 30 MG/1
30 TABLET, FILM COATED ORAL DAILY
Qty: 30 TABLET | Refills: 11
Start: 2024-03-27 | End: 2024-03-28 | Stop reason: SDUPTHER

## 2024-03-27 ASSESSMENT — ENCOUNTER SYMPTOMS
DIARRHEA: 0
CONSTIPATION: 0
VOICE CHANGE: 1
TROUBLE SWALLOWING: 0

## 2024-03-27 NOTE — PROGRESS NOTES
1     Standing Expiration Date:   3/27/2025    TSH with Reflex     Standing Status:   Future     Number of Occurrences:   1     Standing Expiration Date:   3/27/2025    Comprehensive Metabolic Panel     Standing Status:   Future     Standing Expiration Date:   3/27/2025    Phosphorus     Standing Status:   Future     Standing Expiration Date:   3/27/2025    PTH, Intact     Standing Status:   Future     Standing Expiration Date:   3/27/2025    Vitamin D 25 Hydroxy     Standing Status:   Future     Standing Expiration Date:   3/27/2025       Current Outpatient Medications   Medication Sig Dispense Refill    cinacalcet (SENSIPAR) 30 MG tablet Take 1 tablet by mouth daily 30 tablet 11    Cholecalciferol 50 MCG (2000 UT) TABS Take 1 tablet by mouth daily 30 tablet 11    magnesium 200 MG TABS tablet Take 1 tablet by mouth daily      Docusate Sodium 100 MG TABS Take 1 tablet by mouth daily.      diclofenac sodium (VOLTAREN) 1 % GEL Apply 2 g topically 2 times daily as needed for Pain Apply to the affected area (Patient taking differently: Apply 2 g topically 2 times daily as needed for Pain Apply to R knee) 100 g 0    amLODIPine (NORVASC) 10 MG tablet Take 1 tablet by mouth daily      atorvastatin (LIPITOR) 40 MG tablet Take 1 tablet by mouth daily      aspirin 81 MG EC tablet Take 1 tablet by mouth daily      acetaminophen (TYLENOL 8 HOUR) 650 MG extended release tablet Take 1 tablet by mouth every 8 hours as needed for Pain      loratadine (CLARITIN) 10 MG tablet loratadine 10 mg tablet   TAKE ONE TABLET BY MOUTH ONE TIME DAILY      fluticasone (FLONASE) 50 MCG/ACT nasal spray USE TWO SPRAYS IN THE AFFECTED NOSTRIL ONE TIME DAILY (Patient not taking: Reported on 3/27/2024)       No current facility-administered medications for this visit.       POLO Dutta MD, FACE      Portions of this note were generated with the assistance of voice recognition software.  As such, some errors in transcription may be present.

## 2024-03-28 DIAGNOSIS — E55.9 VITAMIN D DEFICIENCY: ICD-10-CM

## 2024-03-28 DIAGNOSIS — E21.0 PRIMARY HYPERPARATHYROIDISM (HCC): ICD-10-CM

## 2024-03-28 LAB
CALCIUM SERPL-MCNC: 11.1 MG/DL (ref 8.3–10.4)
PTH-INTACT SERPL-MCNC: 214 PG/ML (ref 18.5–88)

## 2024-03-28 RX ORDER — CINACALCET 30 MG/1
30 TABLET, FILM COATED ORAL 2 TIMES DAILY
Qty: 60 TABLET | Refills: 11 | Status: SHIPPED | OUTPATIENT
Start: 2024-03-28

## 2024-03-28 NOTE — PROGRESS NOTES
Please let the patient know that her calcium level is still elevated and her vitamin D level is still low.  I would like for her to start taking the Cinacalcet/Sensipar 30 mg tablets twice daily rather than once daily.  She should also double her vitamin D dose from 2000 units/day to 4000 units/day.  I sent the prescription for the Cinacalcet/Sensipar to her pharmacy.  Have her return to the lab in about 6 weeks and repeat levels.  Order is in.  Thanks.

## 2024-03-29 NOTE — PROGRESS NOTES
Spoke to patient, she states the new medication cost $700 and cannot afford that. She wants to know if there is anything different

## 2024-03-29 NOTE — PROGRESS NOTES
The Cinacalcet is not new.  This is the medication that I thought she had already been taking once a day.  I had wanted her to increase to twice a day.  Has she not been taking that?

## 2024-10-22 ASSESSMENT — ENCOUNTER SYMPTOMS
DIARRHEA: 0
CONSTIPATION: 0
TROUBLE SWALLOWING: 0
VOICE CHANGE: 1

## 2024-10-22 NOTE — PROGRESS NOTES
Status:   Future     Standing Expiration Date:   10/23/2025    TSH with Reflex     Standing Status:   Future     Standing Expiration Date:   10/23/2025       Current Outpatient Medications   Medication Sig Dispense Refill    cinacalcet (SENSIPAR) 30 MG tablet Take 1 tablet by mouth in the morning and at bedtime 60 tablet 11    Cholecalciferol 50 MCG (2000 UT) TABS Take 2 tablets by mouth daily 60 tablet 11    magnesium 200 MG TABS tablet Take 1 tablet by mouth daily      Docusate Sodium 100 MG TABS Take 1 tablet by mouth daily.      amLODIPine (NORVASC) 10 MG tablet Take 1 tablet by mouth daily      atorvastatin (LIPITOR) 40 MG tablet Take 1 tablet by mouth daily      aspirin 81 MG EC tablet Take 1 tablet by mouth daily      acetaminophen (TYLENOL 8 HOUR) 650 MG extended release tablet Take 1 tablet by mouth every 8 hours as needed for Pain      fluticasone (FLONASE) 50 MCG/ACT nasal spray        No current facility-administered medications for this visit.       POLO Dutta MD, FACE      Portions of this note were generated with the assistance of voice recognition software.  As such, some errors in transcription may be present.

## 2024-10-23 ENCOUNTER — OFFICE VISIT (OUTPATIENT)
Dept: ENDOCRINOLOGY | Age: 89
End: 2024-10-23
Payer: MEDICARE

## 2024-10-23 VITALS — DIASTOLIC BLOOD PRESSURE: 80 MMHG | SYSTOLIC BLOOD PRESSURE: 122 MMHG | BODY MASS INDEX: 30.54 KG/M2 | WEIGHT: 167 LBS

## 2024-10-23 DIAGNOSIS — E04.2 MULTIPLE THYROID NODULES: ICD-10-CM

## 2024-10-23 DIAGNOSIS — E55.9 VITAMIN D DEFICIENCY: ICD-10-CM

## 2024-10-23 DIAGNOSIS — M85.851 OSTEOPENIA OF RIGHT FEMORAL NECK: ICD-10-CM

## 2024-10-23 DIAGNOSIS — E21.0 PRIMARY HYPERPARATHYROIDISM (HCC): Primary | ICD-10-CM

## 2024-10-23 DIAGNOSIS — E21.0 PRIMARY HYPERPARATHYROIDISM (HCC): ICD-10-CM

## 2024-10-23 LAB
25(OH)D3 SERPL-MCNC: 25.1 NG/ML (ref 30–100)
ALBUMIN SERPL-MCNC: 3.5 G/DL (ref 3.2–4.6)
ALBUMIN/GLOB SERPL: 1 (ref 1–1.9)
ALP SERPL-CCNC: 104 U/L (ref 35–104)
ALT SERPL-CCNC: 8 U/L (ref 8–45)
ANION GAP SERPL CALC-SCNC: 9 MMOL/L (ref 9–18)
AST SERPL-CCNC: 19 U/L (ref 15–37)
BILIRUB SERPL-MCNC: 0.6 MG/DL (ref 0–1.2)
BUN SERPL-MCNC: 14 MG/DL (ref 8–23)
CALCIUM SERPL-MCNC: 11.1 MG/DL (ref 8.8–10.2)
CALCIUM SERPL-MCNC: 11.2 MG/DL (ref 8.8–10.2)
CHLORIDE SERPL-SCNC: 106 MMOL/L (ref 98–107)
CO2 SERPL-SCNC: 26 MMOL/L (ref 20–28)
CREAT SERPL-MCNC: 0.73 MG/DL (ref 0.6–1.1)
GLOBULIN SER CALC-MCNC: 3.4 G/DL (ref 2.3–3.5)
GLUCOSE SERPL-MCNC: 112 MG/DL (ref 70–99)
PHOSPHATE SERPL-MCNC: 1.9 MG/DL (ref 2.5–4.5)
POTASSIUM SERPL-SCNC: 4.2 MMOL/L (ref 3.5–5.1)
PROT SERPL-MCNC: 6.9 G/DL (ref 6.3–8.2)
PTH-INTACT SERPL-MCNC: 133 PG/ML (ref 15–65)
SODIUM SERPL-SCNC: 141 MMOL/L (ref 136–145)

## 2024-10-23 PROCEDURE — 99214 OFFICE O/P EST MOD 30 MIN: CPT | Performed by: INTERNAL MEDICINE

## 2024-10-23 PROCEDURE — 1159F MED LIST DOCD IN RCRD: CPT | Performed by: INTERNAL MEDICINE

## 2024-10-23 PROCEDURE — 1123F ACP DISCUSS/DSCN MKR DOCD: CPT | Performed by: INTERNAL MEDICINE

## 2024-10-23 PROCEDURE — 4004F PT TOBACCO SCREEN RCVD TLK: CPT | Performed by: INTERNAL MEDICINE

## 2024-10-23 PROCEDURE — G8417 CALC BMI ABV UP PARAM F/U: HCPCS | Performed by: INTERNAL MEDICINE

## 2024-10-23 PROCEDURE — G8484 FLU IMMUNIZE NO ADMIN: HCPCS | Performed by: INTERNAL MEDICINE

## 2024-10-23 PROCEDURE — 1090F PRES/ABSN URINE INCON ASSESS: CPT | Performed by: INTERNAL MEDICINE

## 2024-10-23 PROCEDURE — G8427 DOCREV CUR MEDS BY ELIG CLIN: HCPCS | Performed by: INTERNAL MEDICINE

## 2024-10-23 PROCEDURE — G2211 COMPLEX E/M VISIT ADD ON: HCPCS | Performed by: INTERNAL MEDICINE

## 2024-10-23 ASSESSMENT — ENCOUNTER SYMPTOMS: CHOKING: 1

## 2025-04-28 ENCOUNTER — OFFICE VISIT (OUTPATIENT)
Dept: ENDOCRINOLOGY | Age: 89
End: 2025-04-28
Payer: MEDICARE

## 2025-04-28 VITALS
WEIGHT: 163 LBS | HEIGHT: 62 IN | OXYGEN SATURATION: 99 % | SYSTOLIC BLOOD PRESSURE: 130 MMHG | HEART RATE: 88 BPM | DIASTOLIC BLOOD PRESSURE: 83 MMHG | BODY MASS INDEX: 30 KG/M2

## 2025-04-28 DIAGNOSIS — M85.851 OSTEOPENIA OF RIGHT FEMORAL NECK: ICD-10-CM

## 2025-04-28 DIAGNOSIS — E21.0 PRIMARY HYPERPARATHYROIDISM: Primary | ICD-10-CM

## 2025-04-28 DIAGNOSIS — E55.9 VITAMIN D DEFICIENCY: ICD-10-CM

## 2025-04-28 DIAGNOSIS — E21.0 PRIMARY HYPERPARATHYROIDISM: ICD-10-CM

## 2025-04-28 DIAGNOSIS — E04.2 MULTIPLE THYROID NODULES: ICD-10-CM

## 2025-04-28 LAB
25(OH)D3 SERPL-MCNC: 21.5 NG/ML (ref 30–100)
ALBUMIN SERPL-MCNC: 3.6 G/DL (ref 3.2–4.6)
ALBUMIN/GLOB SERPL: 1.2 (ref 1–1.9)
ALP SERPL-CCNC: 96 U/L (ref 35–104)
ALT SERPL-CCNC: 9 U/L (ref 8–45)
ANION GAP SERPL CALC-SCNC: 9 MMOL/L (ref 7–16)
AST SERPL-CCNC: 16 U/L (ref 15–37)
BILIRUB SERPL-MCNC: 0.7 MG/DL (ref 0–1.2)
BUN SERPL-MCNC: 10 MG/DL (ref 8–23)
CALCIUM SERPL-MCNC: 11 MG/DL (ref 8.8–10.2)
CALCIUM SERPL-MCNC: 11.1 MG/DL (ref 8.8–10.2)
CHLORIDE SERPL-SCNC: 107 MMOL/L (ref 98–107)
CO2 SERPL-SCNC: 26 MMOL/L (ref 20–29)
CREAT SERPL-MCNC: 0.79 MG/DL (ref 0.6–1.1)
GLOBULIN SER CALC-MCNC: 3 G/DL (ref 2.3–3.5)
GLUCOSE SERPL-MCNC: 108 MG/DL (ref 70–99)
PHOSPHATE SERPL-MCNC: 2 MG/DL (ref 2.5–4.5)
POTASSIUM SERPL-SCNC: 4.4 MMOL/L (ref 3.5–5.1)
PROT SERPL-MCNC: 6.6 G/DL (ref 6.3–8.2)
PTH-INTACT SERPL-MCNC: 162 PG/ML (ref 15–65)
SODIUM SERPL-SCNC: 141 MMOL/L (ref 136–145)
TSH W FREE THYROID IF ABNORMAL: 1 UIU/ML (ref 0.27–4.2)

## 2025-04-28 PROCEDURE — 1123F ACP DISCUSS/DSCN MKR DOCD: CPT | Performed by: INTERNAL MEDICINE

## 2025-04-28 PROCEDURE — 1090F PRES/ABSN URINE INCON ASSESS: CPT | Performed by: INTERNAL MEDICINE

## 2025-04-28 PROCEDURE — 99214 OFFICE O/P EST MOD 30 MIN: CPT | Performed by: INTERNAL MEDICINE

## 2025-04-28 PROCEDURE — 1159F MED LIST DOCD IN RCRD: CPT | Performed by: INTERNAL MEDICINE

## 2025-04-28 PROCEDURE — G8417 CALC BMI ABV UP PARAM F/U: HCPCS | Performed by: INTERNAL MEDICINE

## 2025-04-28 PROCEDURE — 76536 US EXAM OF HEAD AND NECK: CPT | Performed by: INTERNAL MEDICINE

## 2025-04-28 PROCEDURE — 4004F PT TOBACCO SCREEN RCVD TLK: CPT | Performed by: INTERNAL MEDICINE

## 2025-04-28 PROCEDURE — G8427 DOCREV CUR MEDS BY ELIG CLIN: HCPCS | Performed by: INTERNAL MEDICINE

## 2025-04-28 RX ORDER — CINACALCET 30 MG/1
30 TABLET, FILM COATED ORAL 2 TIMES DAILY
Qty: 60 TABLET | Refills: 11 | Status: SHIPPED | OUTPATIENT
Start: 2025-04-28

## 2025-04-28 RX ORDER — ERGOCALCIFEROL 1.25 MG/1
50000 CAPSULE, LIQUID FILLED ORAL WEEKLY
Qty: 13 CAPSULE | Refills: 3 | Status: SHIPPED | OUTPATIENT
Start: 2025-04-28

## 2025-04-28 ASSESSMENT — ENCOUNTER SYMPTOMS
VOICE CHANGE: 1
CONSTIPATION: 0
TROUBLE SWALLOWING: 0
DIARRHEA: 1
CHOKING: 1

## 2025-04-28 NOTE — PROGRESS NOTES
POLO Dutta MD, FACE    [facility]            Reason for visit: Follow-up of primary hyperparathyroidism and multinodular goiter.        ASSESSMENT AND PLAN:    1. Primary hyperparathyroidism (HCC)  She currently takes Cinacalcet 30 mg twice daily.  She did not check requested labs prior to today's appointment.  I will have her check labs today and again prior to the next appointment with me.  I will provide her with longitudinal care.  - Comprehensive Metabolic Panel; Future  - Phosphorus; Future  - PTH, Intact; Future    2. Osteopenia of right femoral neck  She is overdue for repeat bone densitometry.  I previously ordered this, but she did not follow through with having that scheduled.  - Comprehensive Metabolic Panel; Future  - Vitamin D 25 Hydroxy; Future    3. Vitamin D deficiency  She indicates that her primary care provider increased her vitamin D replacement dose to 50,000 units weekly.  I do not have access to any labs or other records.  I will check vitamin D today.  - Vitamin D 25 Hydroxy; Future    4. Multiple thyroid nodules  Ultrasound was repeated most recently today.  My findings were significantly different than those on prior outside ultrasound (for which I do not have access to images) but similar to those done in this office in May 2024.  The dominant nodule in the left lobe technically meets criteria for biopsy, but I recommend that she be followed with serial ultrasound.  I will assess thyroid function today.     - TSH with Reflex; Future      PROCEDURES:    HEAD AND NECK ULTRASOUND    Date of study:  4/28/2025    Performing/interpreting physician:  POLO Dutta MD, FACE    Indication:  thyroid nodule    Technique:  Using a 12 MHz linear transducer, multiple real-time planar images were obtained of the thyroid and surrounding tissues.      Findings:  Right lobe 2.61 x 2.07 x 4.43 cm, isthmus 0.26 cm, left lobe 2.97 x 3.06 x 5.14 cm.

## 2025-05-14 ENCOUNTER — HOSPITAL ENCOUNTER (OUTPATIENT)
Age: 89
Setting detail: OBSERVATION
Discharge: HOME | End: 2025-05-16
Attending: EMERGENCY MEDICINE | Admitting: FAMILY MEDICINE
Payer: MEDICARE

## 2025-05-14 ENCOUNTER — APPOINTMENT (OUTPATIENT)
Dept: CT IMAGING | Age: 89
End: 2025-05-14
Payer: MEDICARE

## 2025-05-14 ENCOUNTER — APPOINTMENT (OUTPATIENT)
Dept: GENERAL RADIOLOGY | Age: 89
End: 2025-05-14
Payer: MEDICARE

## 2025-05-14 DIAGNOSIS — R09.02 HYPOXEMIA: ICD-10-CM

## 2025-05-14 DIAGNOSIS — I10 HYPERTENSION, UNSPECIFIED TYPE: ICD-10-CM

## 2025-05-14 DIAGNOSIS — R07.9 CHEST PAIN, UNSPECIFIED TYPE: Primary | ICD-10-CM

## 2025-05-14 DIAGNOSIS — R51.9 ACUTE NONINTRACTABLE HEADACHE, UNSPECIFIED HEADACHE TYPE: ICD-10-CM

## 2025-05-14 PROBLEM — I16.1 HYPERTENSIVE EMERGENCY: Status: ACTIVE | Noted: 2025-05-14

## 2025-05-14 LAB
ALBUMIN SERPL-MCNC: 3.4 G/DL (ref 3.2–4.6)
ALBUMIN/GLOB SERPL: 1.1 (ref 1–1.9)
ALP SERPL-CCNC: 99 U/L (ref 35–104)
ALT SERPL-CCNC: 8 U/L (ref 8–45)
ANION GAP SERPL CALC-SCNC: 10 MMOL/L (ref 7–16)
AST SERPL-CCNC: 14 U/L (ref 15–37)
BASOPHILS # BLD: 0.04 K/UL (ref 0–0.2)
BASOPHILS NFR BLD: 0.6 % (ref 0–2)
BILIRUB SERPL-MCNC: 0.4 MG/DL (ref 0–1.2)
BUN SERPL-MCNC: 9 MG/DL (ref 8–23)
CALCIUM SERPL-MCNC: 10.5 MG/DL (ref 8.8–10.2)
CHLORIDE SERPL-SCNC: 105 MMOL/L (ref 98–107)
CO2 SERPL-SCNC: 24 MMOL/L (ref 20–29)
CREAT SERPL-MCNC: 0.78 MG/DL (ref 0.6–1.1)
DIFFERENTIAL METHOD BLD: ABNORMAL
EOSINOPHIL # BLD: 0.15 K/UL (ref 0–0.8)
EOSINOPHIL NFR BLD: 2.2 % (ref 0.5–7.8)
ERYTHROCYTE [DISTWIDTH] IN BLOOD BY AUTOMATED COUNT: 13.1 % (ref 11.9–14.6)
GLOBULIN SER CALC-MCNC: 3 G/DL (ref 2.3–3.5)
GLUCOSE SERPL-MCNC: 114 MG/DL (ref 70–99)
HCT VFR BLD AUTO: 34.8 % (ref 35.8–46.3)
HGB BLD-MCNC: 11.7 G/DL (ref 11.7–15.4)
IMM GRANULOCYTES # BLD AUTO: 0.02 K/UL (ref 0–0.5)
IMM GRANULOCYTES NFR BLD AUTO: 0.3 % (ref 0–5)
LIPASE SERPL-CCNC: 22 U/L (ref 13–60)
LYMPHOCYTES # BLD: 1.85 K/UL (ref 0.5–4.6)
LYMPHOCYTES NFR BLD: 26.6 % (ref 13–44)
MCH RBC QN AUTO: 29.7 PG (ref 26.1–32.9)
MCHC RBC AUTO-ENTMCNC: 33.6 G/DL (ref 31.4–35)
MCV RBC AUTO: 88.3 FL (ref 82–102)
MONOCYTES # BLD: 0.42 K/UL (ref 0.1–1.3)
MONOCYTES NFR BLD: 6 % (ref 4–12)
NEUTS SEG # BLD: 4.47 K/UL (ref 1.7–8.2)
NEUTS SEG NFR BLD: 64.3 % (ref 43–78)
NRBC # BLD: 0 K/UL (ref 0–0.2)
PLATELET # BLD AUTO: 248 K/UL (ref 150–450)
PMV BLD AUTO: 9.8 FL (ref 9.4–12.3)
POTASSIUM SERPL-SCNC: 3.8 MMOL/L (ref 3.5–5.1)
POTASSIUM SERPL-SCNC: 4 MMOL/L (ref 3.5–5.1)
PROT SERPL-MCNC: 6.3 G/DL (ref 6.3–8.2)
RBC # BLD AUTO: 3.94 M/UL (ref 4.05–5.2)
SODIUM SERPL-SCNC: 139 MMOL/L (ref 136–145)
TROPONIN T SERPL HS-MCNC: 8.5 NG/L (ref 0–14)
TROPONIN T SERPL HS-MCNC: 9 NG/L (ref 0–14)
WBC # BLD AUTO: 7 K/UL (ref 4.3–11.1)

## 2025-05-14 PROCEDURE — 96361 HYDRATE IV INFUSION ADD-ON: CPT

## 2025-05-14 PROCEDURE — 85025 COMPLETE CBC W/AUTO DIFF WBC: CPT

## 2025-05-14 PROCEDURE — 93005 ELECTROCARDIOGRAM TRACING: CPT | Performed by: EMERGENCY MEDICINE

## 2025-05-14 PROCEDURE — 99285 EMERGENCY DEPT VISIT HI MDM: CPT

## 2025-05-14 PROCEDURE — 6360000004 HC RX CONTRAST MEDICATION: Performed by: EMERGENCY MEDICINE

## 2025-05-14 PROCEDURE — 2500000003 HC RX 250 WO HCPCS: Performed by: FAMILY MEDICINE

## 2025-05-14 PROCEDURE — 6370000000 HC RX 637 (ALT 250 FOR IP): Performed by: FAMILY MEDICINE

## 2025-05-14 PROCEDURE — 80053 COMPREHEN METABOLIC PANEL: CPT

## 2025-05-14 PROCEDURE — 96372 THER/PROPH/DIAG INJ SC/IM: CPT

## 2025-05-14 PROCEDURE — 70450 CT HEAD/BRAIN W/O DYE: CPT

## 2025-05-14 PROCEDURE — G0378 HOSPITAL OBSERVATION PER HR: HCPCS

## 2025-05-14 PROCEDURE — 6360000002 HC RX W HCPCS: Performed by: FAMILY MEDICINE

## 2025-05-14 PROCEDURE — 71045 X-RAY EXAM CHEST 1 VIEW: CPT

## 2025-05-14 PROCEDURE — 84484 ASSAY OF TROPONIN QUANT: CPT

## 2025-05-14 PROCEDURE — 2580000003 HC RX 258: Performed by: EMERGENCY MEDICINE

## 2025-05-14 PROCEDURE — 96375 TX/PRO/DX INJ NEW DRUG ADDON: CPT

## 2025-05-14 PROCEDURE — 96374 THER/PROPH/DIAG INJ IV PUSH: CPT

## 2025-05-14 PROCEDURE — 70498 CT ANGIOGRAPHY NECK: CPT

## 2025-05-14 PROCEDURE — 84132 ASSAY OF SERUM POTASSIUM: CPT

## 2025-05-14 PROCEDURE — 83690 ASSAY OF LIPASE: CPT

## 2025-05-14 PROCEDURE — 6360000002 HC RX W HCPCS: Performed by: EMERGENCY MEDICINE

## 2025-05-14 RX ORDER — POTASSIUM CHLORIDE 7.45 MG/ML
10 INJECTION INTRAVENOUS PRN
Status: DISCONTINUED | OUTPATIENT
Start: 2025-05-14 | End: 2025-05-16 | Stop reason: HOSPADM

## 2025-05-14 RX ORDER — 0.9 % SODIUM CHLORIDE 0.9 %
500 INTRAVENOUS SOLUTION INTRAVENOUS
Status: COMPLETED | OUTPATIENT
Start: 2025-05-14 | End: 2025-05-14

## 2025-05-14 RX ORDER — SODIUM CHLORIDE 9 MG/ML
INJECTION, SOLUTION INTRAVENOUS PRN
Status: DISCONTINUED | OUTPATIENT
Start: 2025-05-14 | End: 2025-05-16 | Stop reason: HOSPADM

## 2025-05-14 RX ORDER — BISACODYL 10 MG
10 SUPPOSITORY, RECTAL RECTAL DAILY PRN
Status: DISCONTINUED | OUTPATIENT
Start: 2025-05-14 | End: 2025-05-16 | Stop reason: HOSPADM

## 2025-05-14 RX ORDER — HYDRALAZINE HYDROCHLORIDE 20 MG/ML
5 INJECTION INTRAMUSCULAR; INTRAVENOUS ONCE
Status: COMPLETED | OUTPATIENT
Start: 2025-05-14 | End: 2025-05-14

## 2025-05-14 RX ORDER — ACETAMINOPHEN 325 MG/1
650 TABLET ORAL EVERY 6 HOURS PRN
Status: DISCONTINUED | OUTPATIENT
Start: 2025-05-14 | End: 2025-05-16 | Stop reason: HOSPADM

## 2025-05-14 RX ORDER — ATORVASTATIN CALCIUM 40 MG/1
40 TABLET, FILM COATED ORAL DAILY
Status: DISCONTINUED | OUTPATIENT
Start: 2025-05-15 | End: 2025-05-16 | Stop reason: HOSPADM

## 2025-05-14 RX ORDER — SODIUM CHLORIDE 0.9 % (FLUSH) 0.9 %
5-40 SYRINGE (ML) INJECTION EVERY 12 HOURS SCHEDULED
Status: DISCONTINUED | OUTPATIENT
Start: 2025-05-14 | End: 2025-05-16 | Stop reason: HOSPADM

## 2025-05-14 RX ORDER — PROCHLORPERAZINE EDISYLATE 5 MG/ML
10 INJECTION INTRAMUSCULAR; INTRAVENOUS
Status: COMPLETED | OUTPATIENT
Start: 2025-05-14 | End: 2025-05-14

## 2025-05-14 RX ORDER — ERGOCALCIFEROL 1.25 MG/1
50000 CAPSULE, LIQUID FILLED ORAL WEEKLY
Status: DISCONTINUED | OUTPATIENT
Start: 2025-05-15 | End: 2025-05-16 | Stop reason: HOSPADM

## 2025-05-14 RX ORDER — AMLODIPINE BESYLATE 10 MG/1
10 TABLET ORAL DAILY
Status: DISCONTINUED | OUTPATIENT
Start: 2025-05-15 | End: 2025-05-16 | Stop reason: HOSPADM

## 2025-05-14 RX ORDER — VITAMIN B COMPLEX
4000 TABLET ORAL DAILY
Status: DISCONTINUED | OUTPATIENT
Start: 2025-05-15 | End: 2025-05-15

## 2025-05-14 RX ORDER — KETOROLAC TROMETHAMINE 15 MG/ML
15 INJECTION, SOLUTION INTRAMUSCULAR; INTRAVENOUS
Status: COMPLETED | OUTPATIENT
Start: 2025-05-14 | End: 2025-05-14

## 2025-05-14 RX ORDER — POLYETHYLENE GLYCOL 3350 17 G/17G
17 POWDER, FOR SOLUTION ORAL DAILY PRN
Status: DISCONTINUED | OUTPATIENT
Start: 2025-05-14 | End: 2025-05-16 | Stop reason: HOSPADM

## 2025-05-14 RX ORDER — LOSARTAN POTASSIUM 50 MG/1
50 TABLET ORAL 2 TIMES DAILY
Qty: 60 TABLET | Refills: 0 | Status: SHIPPED | OUTPATIENT
Start: 2025-05-14 | End: 2025-05-16

## 2025-05-14 RX ORDER — ASPIRIN 81 MG/1
81 TABLET ORAL DAILY
Status: DISCONTINUED | OUTPATIENT
Start: 2025-05-15 | End: 2025-05-16 | Stop reason: HOSPADM

## 2025-05-14 RX ORDER — LANOLIN ALCOHOL/MO/W.PET/CERES
200 CREAM (GRAM) TOPICAL
Status: DISCONTINUED | OUTPATIENT
Start: 2025-05-15 | End: 2025-05-16 | Stop reason: HOSPADM

## 2025-05-14 RX ORDER — SODIUM CHLORIDE 0.9 % (FLUSH) 0.9 %
5-40 SYRINGE (ML) INJECTION PRN
Status: DISCONTINUED | OUTPATIENT
Start: 2025-05-14 | End: 2025-05-16 | Stop reason: HOSPADM

## 2025-05-14 RX ORDER — METOPROLOL TARTRATE 100 MG/1
200 TABLET ORAL 2 TIMES DAILY
Status: DISCONTINUED | OUTPATIENT
Start: 2025-05-14 | End: 2025-05-15

## 2025-05-14 RX ORDER — DOCUSATE SODIUM 100 MG/1
CAPSULE, LIQUID FILLED ORAL
Status: DISCONTINUED | OUTPATIENT
Start: 2025-05-15 | End: 2025-05-16 | Stop reason: HOSPADM

## 2025-05-14 RX ORDER — POTASSIUM CHLORIDE 1500 MG/1
40 TABLET, EXTENDED RELEASE ORAL PRN
Status: DISCONTINUED | OUTPATIENT
Start: 2025-05-14 | End: 2025-05-16 | Stop reason: HOSPADM

## 2025-05-14 RX ORDER — MAGNESIUM SULFATE IN WATER 40 MG/ML
2000 INJECTION, SOLUTION INTRAVENOUS PRN
Status: DISCONTINUED | OUTPATIENT
Start: 2025-05-14 | End: 2025-05-16 | Stop reason: HOSPADM

## 2025-05-14 RX ORDER — ENOXAPARIN SODIUM 100 MG/ML
40 INJECTION SUBCUTANEOUS EVERY 24 HOURS
Status: DISCONTINUED | OUTPATIENT
Start: 2025-05-14 | End: 2025-05-16 | Stop reason: HOSPADM

## 2025-05-14 RX ORDER — CINACALCET 30 MG/1
30 TABLET, FILM COATED ORAL 2 TIMES DAILY
Status: DISCONTINUED | OUTPATIENT
Start: 2025-05-14 | End: 2025-05-16 | Stop reason: HOSPADM

## 2025-05-14 RX ORDER — IOPAMIDOL 755 MG/ML
50 INJECTION, SOLUTION INTRAVASCULAR
Status: COMPLETED | OUTPATIENT
Start: 2025-05-14 | End: 2025-05-14

## 2025-05-14 RX ORDER — MAGNESIUM HYDROXIDE/ALUMINUM HYDROXICE/SIMETHICONE 120; 1200; 1200 MG/30ML; MG/30ML; MG/30ML
30 SUSPENSION ORAL EVERY 6 HOURS PRN
Status: DISCONTINUED | OUTPATIENT
Start: 2025-05-14 | End: 2025-05-16 | Stop reason: HOSPADM

## 2025-05-14 RX ORDER — ACETAMINOPHEN 650 MG/1
650 SUPPOSITORY RECTAL EVERY 6 HOURS PRN
Status: DISCONTINUED | OUTPATIENT
Start: 2025-05-14 | End: 2025-05-16 | Stop reason: HOSPADM

## 2025-05-14 RX ORDER — FAMOTIDINE 20 MG/1
10 TABLET, FILM COATED ORAL DAILY PRN
Status: DISCONTINUED | OUTPATIENT
Start: 2025-05-14 | End: 2025-05-16 | Stop reason: HOSPADM

## 2025-05-14 RX ADMIN — SODIUM CHLORIDE, PRESERVATIVE FREE 10 ML: 5 INJECTION INTRAVENOUS at 21:56

## 2025-05-14 RX ADMIN — IOPAMIDOL 50 ML: 755 INJECTION, SOLUTION INTRAVENOUS at 16:41

## 2025-05-14 RX ADMIN — CINACALCET HYDROCHLORIDE 30 MG: 30 TABLET, FILM COATED ORAL at 22:26

## 2025-05-14 RX ADMIN — ENOXAPARIN SODIUM 40 MG: 100 INJECTION SUBCUTANEOUS at 20:38

## 2025-05-14 RX ADMIN — HYDRALAZINE HYDROCHLORIDE 5 MG: 20 INJECTION INTRAMUSCULAR; INTRAVENOUS at 17:02

## 2025-05-14 RX ADMIN — SODIUM CHLORIDE 500 ML: 0.9 INJECTION, SOLUTION INTRAVENOUS at 17:01

## 2025-05-14 RX ADMIN — PROCHLORPERAZINE EDISYLATE 10 MG: 5 INJECTION INTRAMUSCULAR; INTRAVENOUS at 17:01

## 2025-05-14 RX ADMIN — METOPROLOL TARTRATE 200 MG: 100 TABLET, FILM COATED ORAL at 21:56

## 2025-05-14 RX ADMIN — KETOROLAC TROMETHAMINE 15 MG: 15 INJECTION, SOLUTION INTRAMUSCULAR; INTRAVENOUS at 17:01

## 2025-05-14 ASSESSMENT — PAIN DESCRIPTION - LOCATION: LOCATION: CHEST

## 2025-05-14 ASSESSMENT — PAIN DESCRIPTION - PAIN TYPE: TYPE: ACUTE PAIN

## 2025-05-14 ASSESSMENT — ENCOUNTER SYMPTOMS
COLOR CHANGE: 0
COUGH: 0
VOMITING: 0
ABDOMINAL PAIN: 0
DIARRHEA: 0
NAUSEA: 1
BACK PAIN: 0
CONSTIPATION: 0
SHORTNESS OF BREATH: 0

## 2025-05-14 ASSESSMENT — PAIN - FUNCTIONAL ASSESSMENT: PAIN_FUNCTIONAL_ASSESSMENT: 0-10

## 2025-05-14 ASSESSMENT — LIFESTYLE VARIABLES
HOW OFTEN DO YOU HAVE A DRINK CONTAINING ALCOHOL: NEVER
HOW MANY STANDARD DRINKS CONTAINING ALCOHOL DO YOU HAVE ON A TYPICAL DAY: PATIENT DOES NOT DRINK

## 2025-05-14 ASSESSMENT — PAIN DESCRIPTION - ORIENTATION: ORIENTATION: MID

## 2025-05-14 ASSESSMENT — PAIN DESCRIPTION - DESCRIPTORS: DESCRIPTORS: ACHING

## 2025-05-14 ASSESSMENT — PAIN SCALES - GENERAL: PAINLEVEL_OUTOF10: 7

## 2025-05-14 NOTE — ED PROVIDER NOTES
Emergency Department Provider Note       [facility] EMERGENCY DEPT   PCP: Ita Stephen APRN - NP   Age: 89 y.o.   Sex: female     DISPOSITION Decision To Admit 05/14/2025 06:57:09 PM    ICD-10-CM    1. Chest pain, unspecified type  R07.9       2. Acute nonintractable headache, unspecified headache type  R51.9       3. Hypertension, unspecified type  I10       4. Hypoxemia  R09.02           Medical Decision Making     Patient presents with hypertension headache and chest pain.  Treated with medication here for her headache and chest pain and blood pressure.  Seems improved now.  Patient resting comfortably on reexamination.  Has a mild headache on waking.  No acute on CT or CTA of her head.  No hemorrhage.  No EKG changes and 2 negative troponins.  Will discharge with an additional blood pressure medication and PCP follow-up.    Attempted to discharge patient but while she has been sleeping her oxygen has been dropping into the 80s.  86% has been the lowest.  Patient continues to have diffuse headache and blood pressure in the 170s.  Will admit for further treatment and evaluation.  Placed on 2 L nasal cannula oxygen at this time.     1 or more acute illnesses that pose a threat to life or bodily function.   Prescription drug management performed.  Patient was discharged risks and benefits of hospitalization were considered.  Shared medical decision making was utilized in creating the patients health plan today.  I independently ordered and reviewed each unique test.       The patients assessment required an independent historian: family.  The reason they were needed is important historical information not provided by the patient.  ED cardiac monitoring rhythm strip was ordered and interpreted:  sinus rhythm, no evidence of an arrhythmia  ST Segments:Nonspecific ST segments - NO STEMI   Rate: 83  I interpreted the X-rays no infiltrate.  I interpreted the CT Scan no hemorrhage.  ED provider's independent EKG

## 2025-05-14 NOTE — H&P
Hospitalist History and Physical   Admit Date:  2025  3:44 PM   Name:  Lara Marvin   Age:  89 y.o.  Sex:  female  :  1935   MRN:  [id number]   Room:  ER03/03    Presenting/Chief Complaint: Chest Pain     Reason(s) for Admission: Hypertensive emergency [I16.1]     History of Present Illness:   Lara Marvin is a 89 y.o. female who presented to the ED for cc substernal chest pain radiating to left shoulder since 1030AM today and headache. She took 324mg ASA and 0.8mg sublingual nitroglycerin without relief. Systolic BP per ER provider was 200.  Hydralazine given in the ER and now she is feeling much better.     Patient was actually going to be discharged and was noted to have oxygen saturations drop to 86% when asleep. She was placed on 2L NC. She admits to daytime sleepiness, feeling refreshed after a nap, and snoring.    Hx of HTN, HLD, hyperparathyroidism, PVCs. Grade 1 diastolic dysfunction EF 55%.   Assessment & Plan:     Active Problems:    HTN emergency - amlodipine 10mg daily, and will increase her Metoprolol 150mg in AM and 200mg in PM dose to 200mg BID. Currently, she states her BP usually is 150 systolic at home. Her HR is in the 80s here. Patient confirmed home doses of amlodipine and metoprolol with me.    Hypoxia noted during sleep - Suspecting undiagnosed sleep apnea. Placed as obs. Overnight pulse oximeter tonight. I suspect she can be discharged tomorrow on 5/15 with outpatient sleep study.      HLD - statin     Chest pain - Troponins negative X 2. Suspecting chest pain BP related. She no longer is with chest pain and EKG benign.      Hyperparathyroidism - Sensipar    K hemolyzed. Repeat level.       PT/OT evals ordered?  Not ordered; patient not expected to need rehab  Diet: ADULT DIET; Regular; Low Fat/Low Chol/High Fiber/2 gm Na  VTE prophylaxis: Lovenox  Code status: DNR  Code status discussed: Yes  Blood consent obtained: No      Non-peripheral Lines and Tubes

## 2025-05-14 NOTE — ED TRIAGE NOTES
Patient arrived via EMS from home complaining of substernal chest pain onset 1030 this morning while she was cooking, radiating to the left shoulder, patient states it feels like indigestion. History of Afib. Patient received 324 of ASA, 0.8mg total of SL Nitro, 4mg of Zofran, and 4mg of Morphine without relief of symptoms.

## 2025-05-14 NOTE — ACP (ADVANCE CARE PLANNING)
Advance Care Planning Note   Admit Date:  2025  3:44 PM   Name:  Lara Marvin   Age:  89 y.o.  Sex:  female  :  1935   MRN:  [id number]   Room:  [place] [address]    Lara Marvin has capacity to make her own decisions:   Yes    Other people present:   Son, Efraín    Patient / surrogate decision-maker directed code status:  DNR/DNI    Patient or surrogate consented to discussion of the current conditions, workup, management plans, prognosis, and the risk for further deterioration.  Time spent: 17 minutes in direct discussion.      Signed:  RADU DALY DO

## 2025-05-15 LAB
ANION GAP SERPL CALC-SCNC: 9 MMOL/L (ref 7–16)
BASOPHILS # BLD: 0.03 K/UL (ref 0–0.2)
BASOPHILS NFR BLD: 0.4 % (ref 0–2)
BUN SERPL-MCNC: 8 MG/DL (ref 8–23)
CALCIUM SERPL-MCNC: 10 MG/DL (ref 8.8–10.2)
CHLORIDE SERPL-SCNC: 109 MMOL/L (ref 98–107)
CO2 SERPL-SCNC: 25 MMOL/L (ref 20–29)
CREAT SERPL-MCNC: 0.67 MG/DL (ref 0.6–1.1)
DIFFERENTIAL METHOD BLD: ABNORMAL
EKG ATRIAL RATE: 71 BPM
EKG DIAGNOSIS: NORMAL
EKG P AXIS: 67 DEGREES
EKG P-R INTERVAL: 213 MS
EKG Q-T INTERVAL: 386 MS
EKG QRS DURATION: 96 MS
EKG QTC CALCULATION (BAZETT): 417 MS
EKG R AXIS: 65 DEGREES
EKG T AXIS: 25 DEGREES
EKG VENTRICULAR RATE: 70 BPM
EOSINOPHIL # BLD: 0.05 K/UL (ref 0–0.8)
EOSINOPHIL NFR BLD: 0.7 % (ref 0.5–7.8)
ERYTHROCYTE [DISTWIDTH] IN BLOOD BY AUTOMATED COUNT: 13.1 % (ref 11.9–14.6)
GLUCOSE SERPL-MCNC: 93 MG/DL (ref 70–99)
HCT VFR BLD AUTO: 34.7 % (ref 35.8–46.3)
HGB BLD-MCNC: 11.3 G/DL (ref 11.7–15.4)
IMM GRANULOCYTES # BLD AUTO: 0.02 K/UL (ref 0–0.5)
IMM GRANULOCYTES NFR BLD AUTO: 0.3 % (ref 0–5)
LYMPHOCYTES # BLD: 1.99 K/UL (ref 0.5–4.6)
LYMPHOCYTES NFR BLD: 27.1 % (ref 13–44)
MCH RBC QN AUTO: 30.1 PG (ref 26.1–32.9)
MCHC RBC AUTO-ENTMCNC: 32.6 G/DL (ref 31.4–35)
MCV RBC AUTO: 92.5 FL (ref 82–102)
MONOCYTES # BLD: 0.57 K/UL (ref 0.1–1.3)
MONOCYTES NFR BLD: 7.8 % (ref 4–12)
NEUTS SEG # BLD: 4.67 K/UL (ref 1.7–8.2)
NEUTS SEG NFR BLD: 63.7 % (ref 43–78)
NRBC # BLD: 0 K/UL (ref 0–0.2)
PLATELET # BLD AUTO: 225 K/UL (ref 150–450)
PMV BLD AUTO: 10.1 FL (ref 9.4–12.3)
POTASSIUM SERPL-SCNC: 4.1 MMOL/L (ref 3.5–5.1)
RBC # BLD AUTO: 3.75 M/UL (ref 4.05–5.2)
SODIUM SERPL-SCNC: 143 MMOL/L (ref 136–145)
WBC # BLD AUTO: 7.3 K/UL (ref 4.3–11.1)

## 2025-05-15 PROCEDURE — 2500000003 HC RX 250 WO HCPCS: Performed by: FAMILY MEDICINE

## 2025-05-15 PROCEDURE — 85025 COMPLETE CBC W/AUTO DIFF WBC: CPT

## 2025-05-15 PROCEDURE — G0378 HOSPITAL OBSERVATION PER HR: HCPCS

## 2025-05-15 PROCEDURE — 96372 THER/PROPH/DIAG INJ SC/IM: CPT

## 2025-05-15 PROCEDURE — 97161 PT EVAL LOW COMPLEX 20 MIN: CPT

## 2025-05-15 PROCEDURE — 6370000000 HC RX 637 (ALT 250 FOR IP): Performed by: INTERNAL MEDICINE

## 2025-05-15 PROCEDURE — 97112 NEUROMUSCULAR REEDUCATION: CPT

## 2025-05-15 PROCEDURE — 97165 OT EVAL LOW COMPLEX 30 MIN: CPT

## 2025-05-15 PROCEDURE — 93010 ELECTROCARDIOGRAM REPORT: CPT | Performed by: INTERNAL MEDICINE

## 2025-05-15 PROCEDURE — 97530 THERAPEUTIC ACTIVITIES: CPT

## 2025-05-15 PROCEDURE — 36415 COLL VENOUS BLD VENIPUNCTURE: CPT

## 2025-05-15 PROCEDURE — 97535 SELF CARE MNGMENT TRAINING: CPT

## 2025-05-15 PROCEDURE — 80048 BASIC METABOLIC PNL TOTAL CA: CPT

## 2025-05-15 PROCEDURE — 6360000002 HC RX W HCPCS: Performed by: FAMILY MEDICINE

## 2025-05-15 PROCEDURE — 6370000000 HC RX 637 (ALT 250 FOR IP): Performed by: FAMILY MEDICINE

## 2025-05-15 RX ORDER — LOSARTAN POTASSIUM 50 MG/1
50 TABLET ORAL 2 TIMES DAILY
Status: DISCONTINUED | OUTPATIENT
Start: 2025-05-15 | End: 2025-05-16 | Stop reason: HOSPADM

## 2025-05-15 RX ORDER — METOPROLOL SUCCINATE 100 MG/1
200 TABLET, EXTENDED RELEASE ORAL DAILY
Status: DISCONTINUED | OUTPATIENT
Start: 2025-05-15 | End: 2025-05-16 | Stop reason: HOSPADM

## 2025-05-15 RX ADMIN — CINACALCET HYDROCHLORIDE 30 MG: 30 TABLET, FILM COATED ORAL at 08:22

## 2025-05-15 RX ADMIN — ATORVASTATIN CALCIUM 40 MG: 40 TABLET, FILM COATED ORAL at 08:23

## 2025-05-15 RX ADMIN — ASPIRIN 81 MG: 81 TABLET ORAL at 08:22

## 2025-05-15 RX ADMIN — AMLODIPINE BESYLATE 10 MG: 10 TABLET ORAL at 08:25

## 2025-05-15 RX ADMIN — ERGOCALCIFEROL 50000 UNITS: 1.25 CAPSULE ORAL at 08:23

## 2025-05-15 RX ADMIN — LOSARTAN POTASSIUM 50 MG: 50 TABLET, FILM COATED ORAL at 11:35

## 2025-05-15 RX ADMIN — MAGNESIUM GLUCONATE 500 MG ORAL TABLET 200 MG: 500 TABLET ORAL at 08:22

## 2025-05-15 RX ADMIN — VITAMIN D, TAB 1000IU (100/BT) 4000 UNITS: 25 TAB at 08:25

## 2025-05-15 RX ADMIN — ENOXAPARIN SODIUM 40 MG: 100 INJECTION SUBCUTANEOUS at 19:56

## 2025-05-15 RX ADMIN — METOPROLOL TARTRATE 200 MG: 100 TABLET, FILM COATED ORAL at 08:23

## 2025-05-15 RX ADMIN — SODIUM CHLORIDE, PRESERVATIVE FREE 10 ML: 5 INJECTION INTRAVENOUS at 19:56

## 2025-05-15 RX ADMIN — CINACALCET HYDROCHLORIDE 30 MG: 30 TABLET, FILM COATED ORAL at 19:56

## 2025-05-15 RX ADMIN — DOCUSATE SODIUM 100 MG: 100 CAPSULE, LIQUID FILLED ORAL at 08:23

## 2025-05-15 RX ADMIN — SODIUM CHLORIDE, PRESERVATIVE FREE 10 ML: 5 INJECTION INTRAVENOUS at 08:25

## 2025-05-15 NOTE — PROGRESS NOTES
ACUTE PHYSICAL THERAPY GOALS:   (Developed with and agreed upon by patient and/or caregiver.)    (1.) Lara Marvin  will move from supine to sit and sit to supine  with SUPERVISION within 7 treatment day(s).    (2.) Lara Marvin will transfer from bed to chair and chair to bed with SUPERVISION using the least restrictive device within 7 treatment day(s).    (3.) Lara Marvin will ambulate with SUPERVISION for 350 feet with the least restrictive device within 7 treatment day(s).   (4.) Lara Marvin will perform standing static and dynamic balance activities x 10 minutes with SUPERVISION to improve safety within 7 treatment day(s).  (5.) Lara Marvin will perform therapeutic exercises x 10 min for HEP with SUPERVISION to improve strength, endurance, and functional mobility within 7 treatment day(s).      PHYSICAL THERAPY Initial Assessment, Daily Note, and PM  (Link to Caseload Tracking: PT Visit Days : 1  Acknowledge Orders  Time In/Out  PT Charge Capture  Rehab Caseload Tracker    Lara Marvin is a 89 y.o. female   PRIMARY DIAGNOSIS: Hypertensive emergency  Hypoxemia [R09.02]  Hypertensive emergency [I16.1]  Acute nonintractable headache, unspecified headache type [R51.9]  Chest pain, unspecified type [R07.9]  Hypertension, unspecified type [I10]       Reason for Referral: Generalized Muscle Weakness (M62.81)  Observation: Payor: MEDICARE / Plan: MEDICARE PART A AND B / Product Type: *No Product type* /     ASSESSMENT:     REHAB RECOMMENDATIONS:   Recommendation to date pending progress:  Setting:  Home Health Therapy    Equipment:    To Be Determined     ASSESSMENT:  Ms. Marvin is a 89 year old female who presents to hospital secondary to above diagnosis. Pt in room with friends and family, reports that she lives with  who is 100 y/o, assists him as needed, pushes him in w/c, lives in 1 level home with level entry, was I with mob and ADLs no AD  PTA, no home O2. Today pt presents on 1.5L O2, 99%, placed on RA, pt stayed above 95% throughout session, RN notified. This date pt performs mobility including sup to sit, STS, amb in hallway 250' no AD, and stood at sink with ADL act with CGA/SBA for safety, pt then assisted and left up in chair, RN notified. Pt presents as functioning slightly below her baseline, with deficits in mobility including transfers, gait, balance, and activity tolerance. Pt will benefit from skilled therapy services to address stated deficits to promote return to highest level of function, independence, and safety. Will continue to follow. .     [facility] AM-PAC™ “6 Clicks” Basic Mobility Inpatient Short Form  -PAC Basic Mobility - Inpatient   How much help is needed turning from your back to your side while in a flat bed without using bedrails?: None  How much help is needed moving from lying on your back to sitting on the side of a flat bed without using bedrails?: None  How much help is needed moving to and from a bed to a chair?: A Little  How much help is needed standing up from a chair using your arms?: A Little  How much help is needed walking in hospital room?: A Little  How much help is needed climbing 3-5 steps with a railing?: A Little  AM-[facility] Inpatient Mobility Raw Score : 20  AM-PAC Inpatient T-Scale Score : 47.67  Mobility Inpatient CMS 0-100% Score: 35.83  Mobility Inpatient CMS G-Code Modifier : CJ    SUBJECTIVE:   Ms. Marvin states, \"Feel better today\"     Social/Functional Lives With: Spouse  Type of Home: House  Home Layout: One level  Home Access: Ramped entrance  Home Equipment: None  Receives Help From: Family  Prior Level of Assist for ADLs: Independent  Active : Yes  Mode of Transportation: Car  Occupation: Retired    OBJECTIVE:     PAIN: VITALS / O2: PRECAUTION / LINES / DRAINS:   Pre Treatment:   Pain Assessment: None - Denies Pain      Post Treatment: none stated Vitals        Oxygen   See

## 2025-05-15 NOTE — PROGRESS NOTES
4 Eyes Skin Assessment     NAME:  Lara Marvin  YOB: 1935  MEDICAL RECORD NUMBER:  [id number]    The patient is being assessed for  Admission    I agree that at least one RN has performed a thorough Head to Toe Skin Assessment on the patient. ALL assessment sites listed below have been assessed.      Areas assessed by both nurses:    Head, Face, Ears, Shoulders, Back, Chest, Arms, Elbows, Hands, Sacrum. Buttock, Coccyx, Ischium, and Legs. Feet and Heels        Does the Patient have a Wound? No noted wound(s)       Marshal Prevention initiated by RN: No  Wound Care Orders initiated by RN: No    Nurse 1 eSignature: Electronically signed by Khloe Austin RN on 5/15/25 at 12:34 AM EDT    **SHARE this note so that the co-signing nurse can place an eSignature**    Nurse 2 eSignature: Electronically signed by Cassandra Wagner RN on 5/15/25 at 12:34 AM EDT

## 2025-05-15 NOTE — PROGRESS NOTES
ACUTE OCCUPATIONAL THERAPY GOALS:   (Developed with and agreed upon by patient and/or caregiver.)  1. Patient will complete lower body bathing and dressing with MOD I and adaptive equipment as needed.   2.Patient will complete upper body bathing and dressing with MOD I and adaptive equipment as needed.  3. Patient will complete toileting with MOD I.   4. Patient will tolerate 30 minutes of OT treatment with 1-2 rest breaks to increase activity tolerance for ADLs.   5. Patient will complete functional transfers with MOD I and adaptive equipment as needed.   6. Patient will complete functional activity with MOD I and adaptive equipment as needed.  7. Patient will complete simple grooming task standing at the sink with MOD I.    Timeframe: 7 visits      OCCUPATIONAL THERAPY Initial Assessment and Daily Note       OT Visit Days: 1  Acknowledge Orders  Time  OT Charge Capture  Rehab Caseload Tracker      Lara Marvin is a 89 y.o. female   PRIMARY DIAGNOSIS: Hypertensive emergency  Hypoxemia [R09.02]  Hypertensive emergency [I16.1]  Acute nonintractable headache, unspecified headache type [R51.9]  Chest pain, unspecified type [R07.9]  Hypertension, unspecified type [I10]       Reason for Referral: Generalized Muscle Weakness (M62.81)  Other lack of cordination (R27.8)  Difficulty in walking, Not elsewhere classified (R26.2)  Observation: Payor: MEDICARE / Plan: MEDICARE PART A AND B / Product Type: *No Product type* /     ASSESSMENT:     REHAB RECOMMENDATIONS:   Recommendation to date pending progress:  Setting:  Home Health Therapy    Equipment:    To Be Determined     ASSESSMENT:  Ms. Marvin presented to the hospital with hypertensive emergency. At baseline, pt is independent for all mobility, bADLs, and IADLs. Still drives. 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   Today, pt was received supine in bed. Completed bed mobility, LB dressing, functional transfers, ambulation

## 2025-05-15 NOTE — CARE COORDINATION
CM met with patient to complete assessment. Demographics verified. Patient insured with a supplemental policy and is able to afford prescription medications. Patient lives with her spouse in a one level home, who she reports is under home hospice. Patient reassured CM, family is with spouse, while she is hospitalized. At baseline, patient is independent with completing ADL's and drives. No DME use. PT/OT consulted. CM will await therapy recommendations, to assist further with discharge planning.      05/15/25 2232   Service Assessment   Patient Orientation Alert and Oriented;Person;Place;Situation   Cognition Alert   History Provided By Patient;Medical Record   Primary Caregiver Self   Accompanied By/Relationship [employer] [facility]   Patient's Healthcare Decision Maker is: Legal Next of Kin  (Spouse.)   PCP Verified by CM No  (CM to send referral.)   Prior Functional Level Independent in ADLs/IADLs   Current Functional Level Other (see comment)  (PT/OT consulted.)   Can patient return to prior living arrangement Unknown at present   Ability to make needs known: Good   Family able to assist with home care needs: Yes   Financial Resources Medicare  (Patient insured with a supplemental policy.)   Community Resources None   CM/SW Referral Other (see comment)  (pending..)   Social/Functional History   Lives With Spouse   Type of Home House   Home Layout One level   Home Access Ramped entrance   Home Equipment None   Receives Help From Family   Prior Level of Assist for ADLs Independent   Ambulation Assistance Independent   Active  Yes   Mode of Transportation Car   Occupation Retired   Discharge Planning   Type of Residence House   Living Arrangements Spouse/Significant Other   Current Services Prior To Admission None   Potential Assistance Needed Home Care   DME Ordered? No   Potential Assistance Purchasing Medications No  (Patient insured with a supplemental policy.)   Patient expects to be  discharged to: Unknown   Services At/After Discharge   Transition of Care Consult (CM Consult) Discharge Planning    Resource Information Provided? No   Mode of Transport at Discharge Other (see comment)  (TBD, based upon need..)   Confirm Follow Up Transport Family   Condition of Participation: Discharge Planning   The Plan for Transition of Care is related to the following treatment goals: pending clinical course.

## 2025-05-15 NOTE — PROGRESS NOTES
Hospitalist Progress Note   Admit Date:  2025  3:44 PM   Name:  Lara Marvin   Age:  89 y.o.  Sex:  female  :  1935   MRN:  [id number]   Room:  [facility]/    Presenting/Chief Complaint: Chest Pain     Reason(s) for Admission: Hypoxemia [R09.02]  Hypertensive emergency [I16.1]  Acute nonintractable headache, unspecified headache type [R51.9]  Chest pain, unspecified type [R07.9]  Hypertension, unspecified type [I10]     Hospital Course:     Copied from admission history and physical HPI:  1030AM today and headache. She took 324mg ASA and 0.8mg sublingual nitroglycerin without relief. Systolic BP per ER provider was 200.  Hydralazine given in the ER and now she is feeling much better.      Patient was actually going to be discharged and was noted to have oxygen saturations drop to 86% when asleep. She was placed on 2L NC. She admits to daytime sleepiness, feeling refreshed after a nap, and snoring.     Hx of HTN, HLD, hyperparathyroidism, PVCs. Grade 1 diastolic dysfunction EF 55%.           Subjective & 24hr Events:     BP better-overnight oximetry pending-PT and OT eval's pending-may be able to discharge home tomorrow with home health with possible home oxygen pending outpatient sleep study which will need to be arranged through outpatient provider.  She offers no new complaints.        Assessment & Plan:      Active Problems:     HTN emergency - amlodipine 10mg daily, and will increase her Metoprolol 150mg in AM and 200mg in PM dose to 200mg BID. Currently, she states her BP usually is 150 systolic at home. Her HR is in the 80s here. Patient confirmed home doses of amlodipine and metoprolol with me.  5/15----change Toprol to  mg once daily May dose in the evening.  Continue amlodipine and restart home ARB if needed.     Hypoxia noted during sleep - Suspecting undiagnosed sleep apnea. Placed as obs. Overnight pulse oximeter tonight. I suspect she can be discharged tomorrow on 5/15 with

## 2025-05-16 VITALS
SYSTOLIC BLOOD PRESSURE: 159 MMHG | TEMPERATURE: 98.4 F | OXYGEN SATURATION: 93 % | BODY MASS INDEX: 30.57 KG/M2 | WEIGHT: 166.1 LBS | HEIGHT: 62 IN | DIASTOLIC BLOOD PRESSURE: 64 MMHG | RESPIRATION RATE: 18 BRPM | HEART RATE: 65 BPM

## 2025-05-16 PROCEDURE — G0378 HOSPITAL OBSERVATION PER HR: HCPCS

## 2025-05-16 PROCEDURE — 6370000000 HC RX 637 (ALT 250 FOR IP): Performed by: FAMILY MEDICINE

## 2025-05-16 PROCEDURE — 94762 N-INVAS EAR/PLS OXIMTRY CONT: CPT

## 2025-05-16 PROCEDURE — 6370000000 HC RX 637 (ALT 250 FOR IP): Performed by: INTERNAL MEDICINE

## 2025-05-16 RX ORDER — LOSARTAN POTASSIUM 50 MG/1
50 TABLET ORAL 2 TIMES DAILY
Qty: 60 TABLET | Refills: 1 | Status: SHIPPED | OUTPATIENT
Start: 2025-05-16

## 2025-05-16 RX ORDER — METOPROLOL SUCCINATE 200 MG/1
200 TABLET, EXTENDED RELEASE ORAL DAILY
Qty: 30 TABLET | Refills: 1 | Status: SHIPPED | OUTPATIENT
Start: 2025-05-16

## 2025-05-16 RX ADMIN — ATORVASTATIN CALCIUM 40 MG: 40 TABLET, FILM COATED ORAL at 09:01

## 2025-05-16 RX ADMIN — DOCUSATE SODIUM 100 MG: 100 CAPSULE, LIQUID FILLED ORAL at 09:01

## 2025-05-16 RX ADMIN — AMLODIPINE BESYLATE 10 MG: 10 TABLET ORAL at 09:01

## 2025-05-16 RX ADMIN — ASPIRIN 81 MG: 81 TABLET ORAL at 09:01

## 2025-05-16 RX ADMIN — LOSARTAN POTASSIUM 50 MG: 50 TABLET, FILM COATED ORAL at 09:02

## 2025-05-16 RX ADMIN — CINACALCET HYDROCHLORIDE 30 MG: 30 TABLET, FILM COATED ORAL at 09:01

## 2025-05-16 RX ADMIN — MAGNESIUM GLUCONATE 500 MG ORAL TABLET 200 MG: 500 TABLET ORAL at 09:02

## 2025-05-16 NOTE — DISCHARGE SUMMARY
for [facility]           STOP taking these medications       vitamin D (ERGOCALCIFEROL) 1.25 MG (25969 UT) CAPS capsule Comments:   Reason for Stopping:               Some of the medications may be marked as \"stop taking\" by the system; but in reality patient or family reported already being off these meds; defer to outpatient/prescribing providers.      Procedures done this admission:  * No surgery found *    Consults this admission:  IP CONSULT HOME HEALTH    Consultants will arrange their own follow ups, if applicable.    Echocardiogram results:  No results found for this or any previous visit.      Diagnostic Imaging/Tests:   CTA HEAD NECK W CONTRAST  Result Date: 5/14/2025  1. No large vessel high-grade stenosis, occlusion or sizable aneurysm. 2. Mild beading/irregularity of both mid cervical ICAs which can be seen with vasculitis (such as fibromuscular dysplasia). Electronically signed by Alton Jackson    XR CHEST PORTABLE  Result Date: 5/14/2025  No acute findings in the chest Electronically signed by David Blanchard    CT HEAD WO CONTRAST  Result Date: 5/14/2025  1. No acute intracranial abnormality. 2. Atrophy and chronic small vessel ischemic changes. Electronically signed by Alton Jackson       Labs: Results:       BMP, Mg, Phos Recent Labs     05/14/25  1557 05/14/25  2041 05/15/25  0556     --  143   K 3.8 4.0 4.1     --  109*   CO2 24  --  25   ANIONGAP 10  --  9   BUN 9  --  8   CREATININE 0.78  --  0.67   LABGLOM 74  --  84   CALCIUM 10.5*  --  10.0   GLUCOSE 114*  --  93      CBC Recent Labs     05/14/25  1557 05/15/25  0556   WBC 7.0 7.3   RBC 3.94* 3.75*   HGB 11.7 11.3*   HCT 34.8* 34.7*   MCV 88.3 92.5   MCH 29.7 30.1   MCHC 33.6 32.6   RDW 13.1 13.1    225   MPV 9.8 10.1   NRBC 0.00 0.00   LYMPHOPCT 26.6 27.1   MONOPCT 6.0 7.8   BASOPCT 0.6 0.4   IMMGRAN 0.3 0.3   LYMPHSABS 1.85 1.99   EOSABS 0.15 0.05   MONOSABS 0.42 0.57   BASOSABS 0.04 0.03   ABSIMMGRAN 0.02 0.02         Physical Exam:    General:    Well nourished.  No overt distress  Head:  Normocephalic, atraumatic  Eyes:  Sclerae appear normal.  Pupils equally round.    HENT:  Nares appear normal, no drainage.  Moist mucous membranes  Neck:  No restricted ROM.  Trachea midline  CV:   RRR.   No JVD  Lungs:   CTAB.  No wheezing, rhonchi, or rales.  Respirations even, unlabored  Abdomen:   Soft, nontender, nondistended.    Extremities: Warm and dry.   No edema.    Skin:     No rashes.  Normal coloration  Neuro:  CN II-XII grossly intact.  Psych:  Normal mood and affect.        Time spent in patient discharge and coordination 38 minutes.      Signed:  Silvano Jo DO    Part of this note may have been written by using a voice dictation software.  The note has been proof read but may still contain some grammatical/other typographical errors.

## 2025-05-16 NOTE — CARE COORDINATION
Chart reviewed by CM. Patient is medically stable for discharge. Patient will return home this day and has selected [facility] for  services. KIMBERLY completed and faxed to [facility] to assist with home oxygen. CM sent a referral to [facility] and [facility], to assist with a PCP. No other discharge needs identified.     Patient's son Genaro to transport patient home.       05/15/25 1218   Service Assessment   Patient Orientation Alert and Oriented;Person;Place;Situation   Cognition Alert   History Provided By Patient;Medical Record   Primary Caregiver Self   Accompanied By/Relationship SonRamiro Lam   [employer] Children   Patient's Healthcare Decision Maker is: Legal Next of Kin  (Spouse.)   PCP Verified by CM No  (Referral sent to [facility] [employer] with pt's consent and BS.)   Prior Functional Level Independent in ADLs/IADLs   Current Functional Level Other (see comment)  (PT/OT consulted, please refer to therapy evaluation.)   Can patient return to prior living arrangement Yes   Ability to make needs known: Good   Family able to assist with home care needs: Yes   Financial Resources Medicare  (Patient insured with a supplemental policy.)   Community Resources None   CM/SW Referral Other (see comment)  (N/A)   Social/Functional History   Lives With Spouse   Type of Home House   Home Layout One level   Home Access Ramped entrance   Home Equipment None   Receives Help From Family   Prior Level of Assist for ADLs Independent   Ambulation Assistance Independent   Active  Yes   Mode of Transportation Car   Occupation Retired   Discharge Planning   Type of Residence House   Living Arrangements Spouse/Significant Other   Current Services Prior To Admission None   Potential Assistance Needed Home Care   DME Ordered? Oxygen therapy (comment)  (KIMBERLY completed. O2 to be arranged with [facility].)   Potential Assistance Purchasing Medications No  (Patient insured with a supplemental policy.)   Type of Home Care Services

## 2025-08-15 ENCOUNTER — OFFICE VISIT (OUTPATIENT)
Dept: ORTHOPEDIC SURGERY | Age: 89
End: 2025-08-15
Payer: MEDICARE

## 2025-08-15 DIAGNOSIS — M25.562 PAIN IN BOTH KNEES, UNSPECIFIED CHRONICITY: ICD-10-CM

## 2025-08-15 DIAGNOSIS — M17.12 PRIMARY OSTEOARTHRITIS OF LEFT KNEE: Primary | ICD-10-CM

## 2025-08-15 DIAGNOSIS — M17.11 PRIMARY OSTEOARTHRITIS OF RIGHT KNEE: ICD-10-CM

## 2025-08-15 DIAGNOSIS — M25.561 PAIN IN BOTH KNEES, UNSPECIFIED CHRONICITY: ICD-10-CM

## 2025-08-15 PROCEDURE — 1090F PRES/ABSN URINE INCON ASSESS: CPT | Performed by: ORTHOPAEDIC SURGERY

## 2025-08-15 PROCEDURE — 1123F ACP DISCUSS/DSCN MKR DOCD: CPT | Performed by: ORTHOPAEDIC SURGERY

## 2025-08-15 PROCEDURE — G8417 CALC BMI ABV UP PARAM F/U: HCPCS | Performed by: ORTHOPAEDIC SURGERY

## 2025-08-15 PROCEDURE — 4004F PT TOBACCO SCREEN RCVD TLK: CPT | Performed by: ORTHOPAEDIC SURGERY

## 2025-08-15 PROCEDURE — 99214 OFFICE O/P EST MOD 30 MIN: CPT | Performed by: ORTHOPAEDIC SURGERY

## 2025-08-15 PROCEDURE — G8428 CUR MEDS NOT DOCUMENT: HCPCS | Performed by: ORTHOPAEDIC SURGERY

## 2025-09-03 ENCOUNTER — OFFICE VISIT (OUTPATIENT)
Dept: ORTHOPEDIC SURGERY | Age: 89
End: 2025-09-03

## 2025-09-03 DIAGNOSIS — M17.11 PRIMARY OSTEOARTHRITIS OF RIGHT KNEE: ICD-10-CM

## 2025-09-03 DIAGNOSIS — M17.12 PRIMARY OSTEOARTHRITIS OF LEFT KNEE: Primary | ICD-10-CM
